# Patient Record
Sex: FEMALE | Race: WHITE | NOT HISPANIC OR LATINO | Employment: OTHER | URBAN - METROPOLITAN AREA
[De-identification: names, ages, dates, MRNs, and addresses within clinical notes are randomized per-mention and may not be internally consistent; named-entity substitution may affect disease eponyms.]

---

## 2020-01-15 ENCOUNTER — HOSPITAL ENCOUNTER (INPATIENT)
Facility: HOSPITAL | Age: 85
LOS: 3 days | Discharge: NURSING, REHAB OR LONG-TERM CARE | End: 2020-01-18
Attending: ORTHOPAEDIC SURGERY | Admitting: ORTHOPAEDIC SURGERY

## 2020-01-15 ENCOUNTER — APPOINTMENT (OUTPATIENT)
Dept: CT IMAGING | Facility: HOSPITAL | Age: 85
End: 2020-01-15

## 2020-01-15 ENCOUNTER — APPOINTMENT (OUTPATIENT)
Dept: GENERAL RADIOLOGY | Facility: HOSPITAL | Age: 85
End: 2020-01-15

## 2020-01-15 PROBLEM — S72.002A LEFT DISPLACED FEMORAL NECK FRACTURE (HCC): Status: ACTIVE | Noted: 2020-01-15

## 2020-01-15 LAB
ANION GAP SERPL CALCULATED.3IONS-SCNC: 13 MMOL/L (ref 5–15)
BUN BLD-MCNC: 25 MG/DL (ref 8–23)
BUN/CREAT SERPL: 25 (ref 7–25)
CALCIUM SPEC-SCNC: 10.2 MG/DL (ref 8.6–10.5)
CHLORIDE SERPL-SCNC: 96 MMOL/L (ref 98–107)
CO2 SERPL-SCNC: 27 MMOL/L (ref 22–29)
CREAT BLD-MCNC: 1 MG/DL (ref 0.57–1)
DEPRECATED RDW RBC AUTO: 42.4 FL (ref 37–54)
ERYTHROCYTE [DISTWIDTH] IN BLOOD BY AUTOMATED COUNT: 12.2 % (ref 12.3–15.4)
GFR SERPL CREATININE-BSD FRML MDRD: 52 ML/MIN/1.73
GLUCOSE BLD-MCNC: 94 MG/DL (ref 65–99)
HCT VFR BLD AUTO: 39.3 % (ref 34–46.6)
HGB BLD-MCNC: 13.1 G/DL (ref 12–15.9)
INR PPP: 1.08 (ref 0.9–1.1)
MCH RBC QN AUTO: 31.3 PG (ref 26.6–33)
MCHC RBC AUTO-ENTMCNC: 33.3 G/DL (ref 31.5–35.7)
MCV RBC AUTO: 94 FL (ref 79–97)
PLATELET # BLD AUTO: 253 10*3/MM3 (ref 140–450)
PMV BLD AUTO: 11 FL (ref 6–12)
POTASSIUM BLD-SCNC: 3.7 MMOL/L (ref 3.5–5.2)
PROTHROMBIN TIME: 13.7 SECONDS (ref 11.7–14.2)
RBC # BLD AUTO: 4.18 10*6/MM3 (ref 3.77–5.28)
SODIUM BLD-SCNC: 136 MMOL/L (ref 136–145)
WBC NRBC COR # BLD: 9.95 10*3/MM3 (ref 3.4–10.8)

## 2020-01-15 PROCEDURE — 71045 X-RAY EXAM CHEST 1 VIEW: CPT

## 2020-01-15 PROCEDURE — 93010 ELECTROCARDIOGRAM REPORT: CPT | Performed by: INTERNAL MEDICINE

## 2020-01-15 PROCEDURE — 85610 PROTHROMBIN TIME: CPT | Performed by: ORTHOPAEDIC SURGERY

## 2020-01-15 PROCEDURE — 80048 BASIC METABOLIC PNL TOTAL CA: CPT | Performed by: ORTHOPAEDIC SURGERY

## 2020-01-15 PROCEDURE — 93005 ELECTROCARDIOGRAM TRACING: CPT | Performed by: ORTHOPAEDIC SURGERY

## 2020-01-15 PROCEDURE — 85027 COMPLETE CBC AUTOMATED: CPT | Performed by: ORTHOPAEDIC SURGERY

## 2020-01-15 PROCEDURE — 73700 CT LOWER EXTREMITY W/O DYE: CPT

## 2020-01-15 RX ORDER — CYCLOSPORINE 0.5 MG/ML
1 EMULSION OPHTHALMIC 2 TIMES DAILY
Status: DISCONTINUED | OUTPATIENT
Start: 2020-01-15 | End: 2020-01-18 | Stop reason: HOSPADM

## 2020-01-15 RX ORDER — HYDROCODONE BITARTRATE AND ACETAMINOPHEN 5; 325 MG/1; MG/1
1 TABLET ORAL EVERY 4 HOURS PRN
Status: DISCONTINUED | OUTPATIENT
Start: 2020-01-15 | End: 2020-01-16

## 2020-01-15 RX ORDER — CYCLOSPORINE 0.5 MG/ML
1 EMULSION OPHTHALMIC 2 TIMES DAILY
COMMUNITY

## 2020-01-15 RX ORDER — HYDROCODONE BITARTRATE AND ACETAMINOPHEN 5; 325 MG/1; MG/1
1 TABLET ORAL EVERY 4 HOURS PRN
COMMUNITY
End: 2021-08-06 | Stop reason: HOSPADM

## 2020-01-15 RX ADMIN — HYDROCODONE BITARTRATE AND ACETAMINOPHEN 1 TABLET: 5; 325 TABLET ORAL at 22:08

## 2020-01-15 RX ADMIN — METOPROLOL TARTRATE 12.5 MG: 25 TABLET ORAL at 23:25

## 2020-01-15 RX ADMIN — CYCLOSPORINE 1 DROP: 0.5 EMULSION OPHTHALMIC at 23:26

## 2020-01-16 ENCOUNTER — ANESTHESIA (OUTPATIENT)
Dept: PERIOP | Facility: HOSPITAL | Age: 85
End: 2020-01-16

## 2020-01-16 ENCOUNTER — ANESTHESIA EVENT (OUTPATIENT)
Dept: PERIOP | Facility: HOSPITAL | Age: 85
End: 2020-01-16

## 2020-01-16 ENCOUNTER — APPOINTMENT (OUTPATIENT)
Dept: GENERAL RADIOLOGY | Facility: HOSPITAL | Age: 85
End: 2020-01-16

## 2020-01-16 PROCEDURE — 25010000003 BUPIVACAINE LIPOSOME 1.3 % SUSPENSION 20 ML VIAL: Performed by: ORTHOPAEDIC SURGERY

## 2020-01-16 PROCEDURE — 25010000003 CEFAZOLIN IN DEXTROSE 2-4 GM/100ML-% SOLUTION: Performed by: ORTHOPAEDIC SURGERY

## 2020-01-16 PROCEDURE — C1713 ANCHOR/SCREW BN/BN,TIS/BN: HCPCS | Performed by: ORTHOPAEDIC SURGERY

## 2020-01-16 PROCEDURE — C1776 JOINT DEVICE (IMPLANTABLE): HCPCS | Performed by: ORTHOPAEDIC SURGERY

## 2020-01-16 PROCEDURE — 72170 X-RAY EXAM OF PELVIS: CPT

## 2020-01-16 PROCEDURE — 25010000002 PROPOFOL 10 MG/ML EMULSION: Performed by: NURSE ANESTHETIST, CERTIFIED REGISTERED

## 2020-01-16 PROCEDURE — 97110 THERAPEUTIC EXERCISES: CPT

## 2020-01-16 PROCEDURE — 25010000002 ONDANSETRON PER 1 MG: Performed by: NURSE ANESTHETIST, CERTIFIED REGISTERED

## 2020-01-16 PROCEDURE — C9290 INJ, BUPIVACAINE LIPOSOME: HCPCS | Performed by: ORTHOPAEDIC SURGERY

## 2020-01-16 PROCEDURE — 25010000002 HYDROMORPHONE PER 4 MG: Performed by: NURSE ANESTHETIST, CERTIFIED REGISTERED

## 2020-01-16 PROCEDURE — 25010000002 FENTANYL CITRATE (PF) 100 MCG/2ML SOLUTION: Performed by: ANESTHESIOLOGY

## 2020-01-16 PROCEDURE — 97162 PT EVAL MOD COMPLEX 30 MIN: CPT

## 2020-01-16 PROCEDURE — 25010000002 FENTANYL CITRATE (PF) 100 MCG/2ML SOLUTION: Performed by: NURSE ANESTHETIST, CERTIFIED REGISTERED

## 2020-01-16 PROCEDURE — 25010000002 NEOSTIGMINE PER 0.5 MG: Performed by: NURSE ANESTHETIST, CERTIFIED REGISTERED

## 2020-01-16 PROCEDURE — 25010000002 MIDAZOLAM PER 1 MG: Performed by: ANESTHESIOLOGY

## 2020-01-16 PROCEDURE — 0SRS0J9 REPLACEMENT OF LEFT HIP JOINT, FEMORAL SURFACE WITH SYNTHETIC SUBSTITUTE, CEMENTED, OPEN APPROACH: ICD-10-PCS | Performed by: ORTHOPAEDIC SURGERY

## 2020-01-16 PROCEDURE — 25010000002 DEXAMETHASONE PER 1 MG: Performed by: NURSE ANESTHETIST, CERTIFIED REGISTERED

## 2020-01-16 DEVICE — SUT FW #2 W/TPR NDL 1/2 CIR 38IN 97CM 26.5MM BLU: Type: IMPLANTABLE DEVICE | Site: HIP | Status: FUNCTIONAL

## 2020-01-16 DEVICE — CMT BONE R 1X40: Type: IMPLANTABLE DEVICE | Site: HIP | Status: FUNCTIONAL

## 2020-01-16 DEVICE — SUT NONABS MAXBRAID/PE NMBR2 HC5 38IN BLU 900334: Type: IMPLANTABLE DEVICE | Site: HIP | Status: FUNCTIONAL

## 2020-01-16 DEVICE — IMPLANTABLE DEVICE: Type: IMPLANTABLE DEVICE | Site: HIP | Status: FUNCTIONAL

## 2020-01-16 DEVICE — LINER VERSYS ASMBL POLY 42/43MM OD X26MM: Type: IMPLANTABLE DEVICE | Site: HIP | Status: FUNCTIONAL

## 2020-01-16 DEVICE — CENTRLZR VERSYS DIST 9MM: Type: IMPLANTABLE DEVICE | Site: HIP | Status: FUNCTIONAL

## 2020-01-16 DEVICE — STEM FEM/HIP VERSYS ADVOCATE NONVLIGN STD/OFFST SZ11 120MM: Type: IMPLANTABLE DEVICE | Site: HIP | Status: FUNCTIONAL

## 2020-01-16 DEVICE — CAP PRT HIP BIPOL: Type: IMPLANTABLE DEVICE | Site: HIP | Status: FUNCTIONAL

## 2020-01-16 RX ORDER — HYDROMORPHONE HYDROCHLORIDE 1 MG/ML
0.5 INJECTION, SOLUTION INTRAMUSCULAR; INTRAVENOUS; SUBCUTANEOUS
Status: DISCONTINUED | OUTPATIENT
Start: 2020-01-16 | End: 2020-01-16 | Stop reason: HOSPADM

## 2020-01-16 RX ORDER — SODIUM CHLORIDE 0.9 % (FLUSH) 0.9 %
10 SYRINGE (ML) INJECTION EVERY 12 HOURS SCHEDULED
Status: DISCONTINUED | OUTPATIENT
Start: 2020-01-16 | End: 2020-01-16 | Stop reason: HOSPADM

## 2020-01-16 RX ORDER — SODIUM CHLORIDE 0.9 % (FLUSH) 0.9 %
10 SYRINGE (ML) INJECTION AS NEEDED
Status: DISCONTINUED | OUTPATIENT
Start: 2020-01-16 | End: 2020-01-16 | Stop reason: HOSPADM

## 2020-01-16 RX ORDER — EPHEDRINE SULFATE 50 MG/ML
INJECTION, SOLUTION INTRAVENOUS AS NEEDED
Status: DISCONTINUED | OUTPATIENT
Start: 2020-01-16 | End: 2020-01-16 | Stop reason: SURG

## 2020-01-16 RX ORDER — DIPHENHYDRAMINE HYDROCHLORIDE 50 MG/ML
12.5 INJECTION INTRAMUSCULAR; INTRAVENOUS
Status: DISCONTINUED | OUTPATIENT
Start: 2020-01-16 | End: 2020-01-16 | Stop reason: HOSPADM

## 2020-01-16 RX ORDER — ACETAMINOPHEN 325 MG/1
650 TABLET ORAL ONCE AS NEEDED
Status: DISCONTINUED | OUTPATIENT
Start: 2020-01-16 | End: 2020-01-16 | Stop reason: HOSPADM

## 2020-01-16 RX ORDER — HYDRALAZINE HYDROCHLORIDE 20 MG/ML
5 INJECTION INTRAMUSCULAR; INTRAVENOUS
Status: DISCONTINUED | OUTPATIENT
Start: 2020-01-16 | End: 2020-01-16 | Stop reason: HOSPADM

## 2020-01-16 RX ORDER — CEFAZOLIN SODIUM 2 G/100ML
2 INJECTION, SOLUTION INTRAVENOUS EVERY 8 HOURS
Status: COMPLETED | OUTPATIENT
Start: 2020-01-16 | End: 2020-01-17

## 2020-01-16 RX ORDER — FLUMAZENIL 0.1 MG/ML
0.2 INJECTION INTRAVENOUS AS NEEDED
Status: DISCONTINUED | OUTPATIENT
Start: 2020-01-16 | End: 2020-01-16 | Stop reason: HOSPADM

## 2020-01-16 RX ORDER — MAGNESIUM HYDROXIDE 1200 MG/15ML
LIQUID ORAL AS NEEDED
Status: DISCONTINUED | OUTPATIENT
Start: 2020-01-16 | End: 2020-01-16 | Stop reason: HOSPADM

## 2020-01-16 RX ORDER — ONDANSETRON 2 MG/ML
4 INJECTION INTRAMUSCULAR; INTRAVENOUS EVERY 6 HOURS PRN
Status: DISCONTINUED | OUTPATIENT
Start: 2020-01-16 | End: 2020-01-18 | Stop reason: HOSPADM

## 2020-01-16 RX ORDER — SODIUM CHLORIDE, SODIUM LACTATE, POTASSIUM CHLORIDE, CALCIUM CHLORIDE 600; 310; 30; 20 MG/100ML; MG/100ML; MG/100ML; MG/100ML
75 INJECTION, SOLUTION INTRAVENOUS CONTINUOUS
Status: ACTIVE | OUTPATIENT
Start: 2020-01-16 | End: 2020-01-16

## 2020-01-16 RX ORDER — PROMETHAZINE HYDROCHLORIDE 25 MG/1
25 SUPPOSITORY RECTAL ONCE AS NEEDED
Status: DISCONTINUED | OUTPATIENT
Start: 2020-01-16 | End: 2020-01-16 | Stop reason: HOSPADM

## 2020-01-16 RX ORDER — HYDROCODONE BITARTRATE AND ACETAMINOPHEN 5; 325 MG/1; MG/1
2 TABLET ORAL EVERY 4 HOURS PRN
Status: DISCONTINUED | OUTPATIENT
Start: 2020-01-16 | End: 2020-01-18 | Stop reason: HOSPADM

## 2020-01-16 RX ORDER — SENNA AND DOCUSATE SODIUM 50; 8.6 MG/1; MG/1
1 TABLET, FILM COATED ORAL 2 TIMES DAILY
Status: DISCONTINUED | OUTPATIENT
Start: 2020-01-16 | End: 2020-01-18 | Stop reason: HOSPADM

## 2020-01-16 RX ORDER — ONDANSETRON 2 MG/ML
4 INJECTION INTRAMUSCULAR; INTRAVENOUS ONCE AS NEEDED
Status: COMPLETED | OUTPATIENT
Start: 2020-01-16 | End: 2020-01-16

## 2020-01-16 RX ORDER — DIPHENHYDRAMINE HCL 25 MG
25 CAPSULE ORAL
Status: DISCONTINUED | OUTPATIENT
Start: 2020-01-16 | End: 2020-01-16 | Stop reason: HOSPADM

## 2020-01-16 RX ORDER — PROMETHAZINE HYDROCHLORIDE 25 MG/ML
12.5 INJECTION, SOLUTION INTRAMUSCULAR; INTRAVENOUS ONCE AS NEEDED
Status: DISCONTINUED | OUTPATIENT
Start: 2020-01-16 | End: 2020-01-16 | Stop reason: HOSPADM

## 2020-01-16 RX ORDER — DIAZEPAM 5 MG/1
5 TABLET ORAL ONCE
Status: COMPLETED | OUTPATIENT
Start: 2020-01-16 | End: 2020-01-16

## 2020-01-16 RX ORDER — ROCURONIUM BROMIDE 10 MG/ML
INJECTION, SOLUTION INTRAVENOUS AS NEEDED
Status: DISCONTINUED | OUTPATIENT
Start: 2020-01-16 | End: 2020-01-16 | Stop reason: SURG

## 2020-01-16 RX ORDER — EPHEDRINE SULFATE 50 MG/ML
5 INJECTION, SOLUTION INTRAVENOUS ONCE AS NEEDED
Status: DISCONTINUED | OUTPATIENT
Start: 2020-01-16 | End: 2020-01-16 | Stop reason: HOSPADM

## 2020-01-16 RX ORDER — SODIUM CHLORIDE, SODIUM LACTATE, POTASSIUM CHLORIDE, CALCIUM CHLORIDE 600; 310; 30; 20 MG/100ML; MG/100ML; MG/100ML; MG/100ML
9 INJECTION, SOLUTION INTRAVENOUS CONTINUOUS PRN
Status: DISCONTINUED | OUTPATIENT
Start: 2020-01-16 | End: 2020-01-18 | Stop reason: HOSPADM

## 2020-01-16 RX ORDER — CEFAZOLIN SODIUM 2 G/100ML
2 INJECTION, SOLUTION INTRAVENOUS ONCE
Status: COMPLETED | OUTPATIENT
Start: 2020-01-16 | End: 2020-01-16

## 2020-01-16 RX ORDER — FENTANYL CITRATE 50 UG/ML
50 INJECTION, SOLUTION INTRAMUSCULAR; INTRAVENOUS
Status: DISCONTINUED | OUTPATIENT
Start: 2020-01-16 | End: 2020-01-16 | Stop reason: HOSPADM

## 2020-01-16 RX ORDER — NALOXONE HCL 0.4 MG/ML
0.1 VIAL (ML) INJECTION
Status: DISCONTINUED | OUTPATIENT
Start: 2020-01-16 | End: 2020-01-16

## 2020-01-16 RX ORDER — HYDROCODONE BITARTRATE AND ACETAMINOPHEN 5; 325 MG/1; MG/1
1 TABLET ORAL EVERY 4 HOURS PRN
Status: DISCONTINUED | OUTPATIENT
Start: 2020-01-16 | End: 2020-01-18 | Stop reason: HOSPADM

## 2020-01-16 RX ORDER — FAMOTIDINE 10 MG/ML
20 INJECTION, SOLUTION INTRAVENOUS
Status: COMPLETED | OUTPATIENT
Start: 2020-01-16 | End: 2020-01-16

## 2020-01-16 RX ORDER — LIDOCAINE HYDROCHLORIDE 20 MG/ML
INJECTION, SOLUTION INFILTRATION; PERINEURAL AS NEEDED
Status: DISCONTINUED | OUTPATIENT
Start: 2020-01-16 | End: 2020-01-16 | Stop reason: SURG

## 2020-01-16 RX ORDER — LABETALOL HYDROCHLORIDE 5 MG/ML
5 INJECTION, SOLUTION INTRAVENOUS
Status: DISCONTINUED | OUTPATIENT
Start: 2020-01-16 | End: 2020-01-16 | Stop reason: HOSPADM

## 2020-01-16 RX ORDER — DEXAMETHASONE SODIUM PHOSPHATE 10 MG/ML
INJECTION INTRAMUSCULAR; INTRAVENOUS AS NEEDED
Status: DISCONTINUED | OUTPATIENT
Start: 2020-01-16 | End: 2020-01-16 | Stop reason: SURG

## 2020-01-16 RX ORDER — FENTANYL CITRATE 50 UG/ML
25 INJECTION, SOLUTION INTRAMUSCULAR; INTRAVENOUS
Status: DISCONTINUED | OUTPATIENT
Start: 2020-01-16 | End: 2020-01-16 | Stop reason: HOSPADM

## 2020-01-16 RX ORDER — GLYCOPYRROLATE 0.2 MG/ML
INJECTION INTRAMUSCULAR; INTRAVENOUS AS NEEDED
Status: DISCONTINUED | OUTPATIENT
Start: 2020-01-16 | End: 2020-01-16 | Stop reason: SURG

## 2020-01-16 RX ORDER — HYDROMORPHONE HYDROCHLORIDE 1 MG/ML
0.5 INJECTION, SOLUTION INTRAMUSCULAR; INTRAVENOUS; SUBCUTANEOUS
Status: DISCONTINUED | OUTPATIENT
Start: 2020-01-16 | End: 2020-01-16

## 2020-01-16 RX ORDER — NALOXONE HCL 0.4 MG/ML
0.2 VIAL (ML) INJECTION AS NEEDED
Status: DISCONTINUED | OUTPATIENT
Start: 2020-01-16 | End: 2020-01-16 | Stop reason: HOSPADM

## 2020-01-16 RX ORDER — ONDANSETRON 2 MG/ML
INJECTION INTRAMUSCULAR; INTRAVENOUS AS NEEDED
Status: DISCONTINUED | OUTPATIENT
Start: 2020-01-16 | End: 2020-01-16 | Stop reason: SURG

## 2020-01-16 RX ORDER — ONDANSETRON 4 MG/1
4 TABLET, FILM COATED ORAL EVERY 6 HOURS PRN
Status: DISCONTINUED | OUTPATIENT
Start: 2020-01-16 | End: 2020-01-18 | Stop reason: HOSPADM

## 2020-01-16 RX ORDER — PROMETHAZINE HYDROCHLORIDE 25 MG/1
25 TABLET ORAL ONCE AS NEEDED
Status: DISCONTINUED | OUTPATIENT
Start: 2020-01-16 | End: 2020-01-16 | Stop reason: HOSPADM

## 2020-01-16 RX ORDER — MIDAZOLAM HYDROCHLORIDE 1 MG/ML
0.5 INJECTION INTRAMUSCULAR; INTRAVENOUS
Status: DISCONTINUED | OUTPATIENT
Start: 2020-01-16 | End: 2020-01-16 | Stop reason: HOSPADM

## 2020-01-16 RX ORDER — MIDAZOLAM HYDROCHLORIDE 1 MG/ML
2 INJECTION INTRAMUSCULAR; INTRAVENOUS
Status: DISCONTINUED | OUTPATIENT
Start: 2020-01-16 | End: 2020-01-16 | Stop reason: HOSPADM

## 2020-01-16 RX ORDER — DIAZEPAM 5 MG/ML
5 INJECTION, SOLUTION INTRAMUSCULAR; INTRAVENOUS ONCE
Status: DISCONTINUED | OUTPATIENT
Start: 2020-01-16 | End: 2020-01-16

## 2020-01-16 RX ORDER — HYDROCODONE BITARTRATE AND ACETAMINOPHEN 7.5; 325 MG/1; MG/1
1 TABLET ORAL ONCE AS NEEDED
Status: DISCONTINUED | OUTPATIENT
Start: 2020-01-16 | End: 2020-01-16 | Stop reason: HOSPADM

## 2020-01-16 RX ORDER — TRANEXAMIC ACID 100 MG/ML
INJECTION, SOLUTION INTRAVENOUS AS NEEDED
Status: DISCONTINUED | OUTPATIENT
Start: 2020-01-16 | End: 2020-01-16 | Stop reason: SURG

## 2020-01-16 RX ORDER — PROPOFOL 10 MG/ML
VIAL (ML) INTRAVENOUS AS NEEDED
Status: DISCONTINUED | OUTPATIENT
Start: 2020-01-16 | End: 2020-01-16 | Stop reason: SURG

## 2020-01-16 RX ORDER — PROMETHAZINE HYDROCHLORIDE 25 MG/ML
6.25 INJECTION, SOLUTION INTRAMUSCULAR; INTRAVENOUS
Status: DISCONTINUED | OUTPATIENT
Start: 2020-01-16 | End: 2020-01-16 | Stop reason: HOSPADM

## 2020-01-16 RX ORDER — OXYCODONE AND ACETAMINOPHEN 7.5; 325 MG/1; MG/1
1 TABLET ORAL ONCE AS NEEDED
Status: DISCONTINUED | OUTPATIENT
Start: 2020-01-16 | End: 2020-01-16 | Stop reason: HOSPADM

## 2020-01-16 RX ADMIN — HYDROCODONE BITARTRATE AND ACETAMINOPHEN 1 TABLET: 5; 325 TABLET ORAL at 13:21

## 2020-01-16 RX ADMIN — CEFAZOLIN SODIUM 2 G: 2 INJECTION, SOLUTION INTRAVENOUS at 08:21

## 2020-01-16 RX ADMIN — HYDROCODONE BITARTRATE AND ACETAMINOPHEN 2 TABLET: 5; 325 TABLET ORAL at 21:47

## 2020-01-16 RX ADMIN — METOPROLOL TARTRATE 12.5 MG: 25 TABLET ORAL at 20:07

## 2020-01-16 RX ADMIN — GLYCOPYRROLATE 0.4 MG: 0.2 INJECTION INTRAMUSCULAR; INTRAVENOUS at 09:57

## 2020-01-16 RX ADMIN — METOPROLOL TARTRATE 12.5 MG: 25 TABLET ORAL at 07:30

## 2020-01-16 RX ADMIN — MIDAZOLAM 0.5 MG: 1 INJECTION INTRAMUSCULAR; INTRAVENOUS at 07:54

## 2020-01-16 RX ADMIN — TRANEXAMIC ACID 1000 MG: 100 INJECTION, SOLUTION INTRAVENOUS at 08:32

## 2020-01-16 RX ADMIN — CEFAZOLIN SODIUM 2 G: 2 INJECTION, SOLUTION INTRAVENOUS at 23:54

## 2020-01-16 RX ADMIN — HYDROCODONE BITARTRATE AND ACETAMINOPHEN 1 TABLET: 5; 325 TABLET ORAL at 02:29

## 2020-01-16 RX ADMIN — EPHEDRINE SULFATE 10 MG: 50 INJECTION INTRAVENOUS at 09:16

## 2020-01-16 RX ADMIN — SENNOSIDES AND DOCUSATE SODIUM 1 TABLET: 8.6; 5 TABLET ORAL at 15:11

## 2020-01-16 RX ADMIN — SODIUM CHLORIDE, POTASSIUM CHLORIDE, SODIUM LACTATE AND CALCIUM CHLORIDE 75 ML/HR: 600; 310; 30; 20 INJECTION, SOLUTION INTRAVENOUS at 13:21

## 2020-01-16 RX ADMIN — ONDANSETRON 4 MG: 2 INJECTION INTRAMUSCULAR; INTRAVENOUS at 11:06

## 2020-01-16 RX ADMIN — SODIUM CHLORIDE, POTASSIUM CHLORIDE, SODIUM LACTATE AND CALCIUM CHLORIDE 9 ML/HR: 600; 310; 30; 20 INJECTION, SOLUTION INTRAVENOUS at 10:29

## 2020-01-16 RX ADMIN — FENTANYL CITRATE 25 MCG: 50 INJECTION INTRAMUSCULAR; INTRAVENOUS at 07:54

## 2020-01-16 RX ADMIN — FAMOTIDINE 20 MG: 10 INJECTION INTRAVENOUS at 07:54

## 2020-01-16 RX ADMIN — CEFAZOLIN SODIUM 2 G: 2 INJECTION, SOLUTION INTRAVENOUS at 16:35

## 2020-01-16 RX ADMIN — LIDOCAINE HYDROCHLORIDE 40 MG: 20 INJECTION, SOLUTION INFILTRATION; PERINEURAL at 08:13

## 2020-01-16 RX ADMIN — FENTANYL CITRATE 50 MCG: 50 INJECTION, SOLUTION INTRAMUSCULAR; INTRAVENOUS at 10:27

## 2020-01-16 RX ADMIN — HYDROMORPHONE HYDROCHLORIDE 0.5 MG: 1 INJECTION, SOLUTION INTRAMUSCULAR; INTRAVENOUS; SUBCUTANEOUS at 10:36

## 2020-01-16 RX ADMIN — FENTANYL CITRATE 50 MCG: 50 INJECTION INTRAMUSCULAR; INTRAVENOUS at 08:33

## 2020-01-16 RX ADMIN — FENTANYL CITRATE 50 MCG: 50 INJECTION INTRAMUSCULAR; INTRAVENOUS at 08:53

## 2020-01-16 RX ADMIN — FENTANYL CITRATE 50 MCG: 50 INJECTION, SOLUTION INTRAMUSCULAR; INTRAVENOUS at 10:48

## 2020-01-16 RX ADMIN — HYDROMORPHONE HYDROCHLORIDE 0.5 MG: 1 INJECTION, SOLUTION INTRAMUSCULAR; INTRAVENOUS; SUBCUTANEOUS at 11:02

## 2020-01-16 RX ADMIN — HYDROCODONE BITARTRATE AND ACETAMINOPHEN 1 TABLET: 5; 325 TABLET ORAL at 17:43

## 2020-01-16 RX ADMIN — DEXAMETHASONE SODIUM PHOSPHATE 8 MG: 10 INJECTION INTRAMUSCULAR; INTRAVENOUS at 08:34

## 2020-01-16 RX ADMIN — ROCURONIUM BROMIDE 40 MG: 10 INJECTION INTRAVENOUS at 08:13

## 2020-01-16 RX ADMIN — PROPOFOL 150 MG: 10 INJECTION, EMULSION INTRAVENOUS at 08:13

## 2020-01-16 RX ADMIN — ONDANSETRON HYDROCHLORIDE 4 MG: 2 SOLUTION INTRAMUSCULAR; INTRAVENOUS at 08:11

## 2020-01-16 RX ADMIN — SODIUM CHLORIDE, POTASSIUM CHLORIDE, SODIUM LACTATE AND CALCIUM CHLORIDE 9 ML/HR: 600; 310; 30; 20 INJECTION, SOLUTION INTRAVENOUS at 07:54

## 2020-01-16 RX ADMIN — ROCURONIUM BROMIDE 10 MG: 10 INJECTION INTRAVENOUS at 09:25

## 2020-01-16 RX ADMIN — NEOSTIGMINE METHYLSULFATE 2.5 MG: 1 INJECTION INTRAMUSCULAR; INTRAVENOUS; SUBCUTANEOUS at 09:57

## 2020-01-16 RX ADMIN — DIAZEPAM 5 MG: 5 TABLET ORAL at 19:05

## 2020-01-16 NOTE — PLAN OF CARE
Problem: Patient Care Overview  Goal: Plan of Care Review  Outcome: Ongoing (interventions implemented as appropriate)  Flowsheets (Taken 1/16/2020 0258)  Progress: no change  Plan of Care Reviewed With: patient  Outcome Summary: VSS during shift. NPO since midnight except sips with medication. Voiding well. NVI. Pain controlled with current pain medication.

## 2020-01-16 NOTE — THERAPY EVALUATION
Patient Name: Bernie Thomas  : 1932    MRN: [id number]                              Today's Date: 2020       Admit Date: 1/15/2020    Visit Dx: No diagnosis found.  Patient Active Problem List   Diagnosis   • Left displaced femoral neck fracture (CMS/HCC)     Past Medical History:   Diagnosis Date   • A-fib (CMS/HCC)    • A-fib (CMS/HCC)    • Osteoporosis      Past Surgical History:   Procedure Laterality Date   • CATARACT EXTRACTION, BILATERAL     • HYSTERECTOMY       General Information     Row Name 20 1620          PT Evaluation Time/Intention    Document Type  evaluation  -EM     Mode of Treatment  individual therapy;physical therapy  -EM     Row Name 20 1620          General Information    Patient Profile Reviewed?  yes  -EM     Prior Level of Function  independent:;community mobility  -EM     Existing Precautions/Restrictions  fall;left;hip, posterior  -EM     Row Name 20 1620          Relationship/Environment    Lives With  alone has good family support  -EM     Row Name 20 1620          Resource/Environmental Concerns    Current Living Arrangements  home/apartment/condo  -EM     Row Name 20 1620          Home Main Entrance    Number of Stairs, Main Entrance  one  -EM     Row Name 20 1620          Stairs Within Home, Primary    Number of Stairs, Within Home, Primary  none  -EM     Row Name 20 1620          Cognitive Assessment/Intervention- PT/OT    Orientation Status (Cognition)  oriented x 4  -EM     Row Name 20 1620          Safety Issues, Functional Mobility    Impairments Affecting Function (Mobility)  endurance/activity tolerance;strength;pain  -EM       User Key  (r) = Recorded By, (t) = Taken By, (c) = Cosigned By    Initials Name Provider Type    EM Vicki Campos, PT Physical Therapist        Mobility     Row Name 20 1621          Bed Mobility Assessment/Treatment    Bed Mobility Assessment/Treatment  supine-sit  -EM      Supine-Sit [place] (Bed Mobility)  contact guard;verbal cues  -EM     Row Name 01/16/20 1621          Sit-Stand Transfer    Sit-Stand [place] (Transfers)  contact guard;verbal cues  -EM     Assistive Device (Sit-Stand Transfers)  walker, front-wheeled  -EM     Row Name 01/16/20 1621          Gait/Stairs Assessment/Training    [place] Level (Gait)  contact guard  -EM     Assistive Device (Gait)  walker, front-wheeled  -EM     Distance in Feet (Gait)  25  -EM     Deviations/Abnormal Patterns (Gait)  gait speed decreased  -EM     Comment (Gait/Stairs)  cues for sequencing  -EM       User Key  (r) = Recorded By, (t) = Taken By, (c) = Cosigned By    Initials Name Provider Type    EM Vicki Campos, PT Physical Therapist        Obj/Interventions     Row Name 01/16/20 1622          General ROM    GENERAL ROM COMMENTS  ROm WNL   -EM     Row Name 01/16/20 1622          MMT (Manual Muscle Testing)    General MMT Comments  no focal deficits identified, general post op weakness noted in LLE but not formally assessed   -EM     Row Name 01/16/20 1622          Therapeutic Exercise    Comment (Therapeutic Exercise)  10 reps ex per THR protocol (pt rested after every 5 reps)   -EM     Row Name 01/16/20 1622          Static Sitting Balance    Level of [place] (Unsupported Sitting, Static Balance)  supervision  -EM     Sitting Position (Unsupported Sitting, Static Balance)  sitting on edge of bed  -EM     Row Name 01/16/20 1622          Dynamic Sitting Balance    Level of [place], Reaches Outside Midline (Sitting, Dynamic Balance)  supervision  -EM     Sitting Position, Reaches Outside Midline (Sitting, Dynamic Balance)  sitting on edge of bed  -EM     Row Name 01/16/20 1622          Static Standing Balance    Level of [place] (Supported Standing, Static Balance)  contact guard assist  -EM     Assistive Device Utilized (Supported Standing, Static Balance)  walker, rolling  -EM     Row Name 01/16/20  1622          Dynamic Standing Balance    Level of [place], Reaches Outside Midline (Standing, Dynamic Balance)  contact guard assist  -EM     Assistive Device Utilized (Supported Standing, Dynamic Balance)  walker, rolling  -EM     Row Name 01/16/20 1622          Sensory Assessment/Intervention    Sensory General Assessment  no sensation deficits identified  -EM       User Key  (r) = Recorded By, (t) = Taken By, (c) = Cosigned By    Initials Name Provider Type    EM Vicki Campos, PT Physical Therapist        Goals/Plan     Row Name 01/16/20 1626          Bed Mobility Goal 1 (PT)    Activity/Assistive Device (Bed Mobility Goal 1, PT)  bed mobility activities, all  -EM     [place] Level/Cues Needed (Bed Mobility Goal 1, PT)  supervision required  -EM     Time Frame (Bed Mobility Goal 1, PT)  3 days  -EM     Row Name 01/16/20 1626          Transfer Goal 1 (PT)    Activity/Assistive Device (Transfer Goal 1, PT)  transfers, all;walker, rolling  -EM     [place] Level/Cues Needed (Transfer Goal 1, PT)  supervision required  -EM     Time Frame (Transfer Goal 1, PT)  3 days  -EM     Row Name 01/16/20 1626          Gait Training Goal 1 (PT)    Activity/Assistive Device (Gait Training Goal 1, PT)  gait (walking locomotion);walker, rolling  -EM     [place] Level (Gait Training Goal 1, PT)  supervision required  -EM     Distance (Gait Goal 1, PT)  200  -EM     Time Frame (Gait Training Goal 1, PT)  3 days  -EM     Row Name 01/16/20 1626          Stairs Goal 1 (PT)    Activity/Assistive Device (Stairs Goal 1, PT)  stairs, all skills;walker, rolling  -EM     [place] Level/Cues Needed (Stairs Goal 1, PT)  contact guard assist  -EM     Number of Stairs (Stairs Goal 1, PT)  2  -EM     Time Frame (Stairs Goal 1, PT)  3 days  -EM     Row Name 01/16/20 1626          Patient Education Goal (PT)    Activity (Patient Education Goal, PT)  patient able to perform 20 reps per THR protocol and verbalize  understanding of THR posterior precautions   -EM     [place]/Cues/Accuracy (Memory Goal 2, PT)  demonstrates adequately;verbalizes understanding  -EM     Time Frame (Patient Education Goal, PT)  3 days  -EM       User Key  (r) = Recorded By, (t) = Taken By, (c) = Cosigned By    Initials Name Provider Type    EM Vicki Campos, PT Physical Therapist        Clinical Impression     Row Name 01/16/20 0413          Pain Assessment    Additional Documentation  Pain Scale: Numbers Pre/Post-Treatment (Group)  -EM     Row Name 01/16/20 1403          Pain Scale: Numbers Pre/Post-Treatment    Pain Scale: Numbers, Pretreatment  7/10  -EM     Pain Location - Side  Left  -EM     Pain Location  hip  -EM     Pain Intervention(s)  Medication (See MAR);Repositioned  -EM     Row Name 01/16/20 5314          Plan of Care Review    Plan of Care Reviewed With  patient;family  -EM     Outcome Summary  Pt. presents s/p L bipolar hemiarthroplasty after being diagnosed with a L femoral neck stress fracture. PMH significant for afib, op. Patient lives alone, one step to enter home, no steps inside. Today, pt performed bed mobility with CGA, sit to stand tsf with CGA and ambulated 25 feet with rwx and CGA. Patient has good family support, d/c plans undecided at this time, possibly SNU or home with HH pending progress. Patient demonstrates impairments consisting of generalized post op weakness and pain, decreased activity tolerance and would benefit from skilled PT.   -EM     Row Name 01/16/20 1625          Physical Therapy Clinical Impression    Patient/Family Goals Statement (PT Clinical Impression)  decrease pain, be able to return to normal activities   -EM     Criteria for Skilled Interventions Met (PT Clinical Impression)  yes;treatment indicated  -EM     Rehab Potential (PT Clinical Summary)  good, to achieve stated therapy goals  -EM     Row Name 01/16/20 9930          Positioning and Restraints    Pre-Treatment Position  in  bed  -EM     Post Treatment Position  chair  -EM     In Chair  reclined;exit alarm on;call light within reach;with family/caregiver  -EM       User Key  (r) = Recorded By, (t) = Taken By, (c) = Cosigned By    Initials Name Provider Type    Vicki Turner PT Physical Therapist        Outcome Measures     Row Name 01/16/20 1628          How much help from another person do you currently need...    Turning from your back to your side while in flat bed without using bedrails?  3  -EM     Moving from lying on back to sitting on the side of a flat bed without bedrails?  3  -EM     Moving to and from a bed to a chair (including a wheelchair)?  3  -EM     Standing up from a chair using your arms (e.g., wheelchair, bedside chair)?  3  -EM     Climbing 3-5 steps with a railing?  3  -EM     To walk in hospital room?  3  -EM     AM-PAC 6 Clicks Score (PT)  18  -EM     Row Name 01/16/20 1629          Functional Assessment    Outcome Measure Options  AM-PAC 6 Clicks Basic Mobility (PT)  -EM       User Key  (r) = Recorded By, (t) = Taken By, (c) = Cosigned By    Initials Name Provider Type    Vicki Turner PT Physical Therapist          PT Recommendation and Plan  Planned Therapy Interventions (PT Eval): bed mobility training, gait training, home exercise program, transfer training  Outcome Summary/Treatment Plan (PT)  Anticipated Discharge Disposition (PT): home with home health, home with assist, skilled nursing facility  Plan of Care Reviewed With: patient, family  Outcome Summary: Pt. presents s/p L bipolar hemiarthroplasty after being diagnosed with a L femoral neck stress fracture. PMH significant for afib, op. Patient lives alone, one step to enter home, no steps inside. Today, pt performed bed mobility with CGA, sit to stand tsf with CGA and ambulated 25 feet with rwx and CGA. Patient has good family support, d/c plans undecided at this time, possibly SNU or home with HH pending progress. Patient  demonstrates impairments consisting of generalized post op weakness and pain, decreased activity tolerance and would benefit from skilled PT.      Time Calculation:   PT Charges     Row Name 01/16/20 1630             Time Calculation    Start Time  1550  -EM      Stop Time  1618  -EM      Time Calculation (min)  28 min  -EM      PT Received On  01/16/20  -EM      PT - Next Appointment  01/17/20  -EM      PT Goal Re-Cert Due Date  01/19/20  -EM         Time Calculation- PT    Total Timed Code Minutes- PT  17 minute(s)  -EM        User Key  (r) = Recorded By, (t) = Taken By, (c) = Cosigned By    Initials Name Provider Type    EM Vicki Campos, PT Physical Therapist        Therapy Charges for Today     Code Description Service Date Service Provider Modifiers Qty    25293777667 HC PT EVAL MOD COMPLEXITY 2 1/16/2020 Vicki Campos, PT GP 1    58143202035 HC PT THER PROC EA 15 MIN 1/16/2020 Vicki Campos PT GP 1          PT G-Codes  Outcome Measure Options: AM-PAC 6 Clicks Basic Mobility (PT)  AM-PAC 6 Clicks Score (PT): 18    Vicki Campos PT  1/16/2020

## 2020-01-16 NOTE — PLAN OF CARE
VSS, pain controlled. Having problems with muscle spasms in left thigh/hip area. Tried pain meds and spasms continued. Got a one time dose order of valium to be given. Waiting for med to profile. Patient up to chair x 1 and voided. Continue to monitor.

## 2020-01-16 NOTE — PROGRESS NOTES
Discharge Planning Assessment  [facility]     Patient Name: Bernie Thomas  MRN: [id number]  Today's Date: 1/16/2020    Admit Date: 1/15/2020    Discharge Needs Assessment     Row Name 01/16/20 1524       Living Environment    Lives With  alone    Current Living Arrangements  home/apartment/condo    Potentially Unsafe Housing Conditions  other (see comments) no concerns     Primary Care Provided by  self    Provides Primary Care For  no one    Family Caregiver if Needed  child(elsa), adult    Quality of Family Relationships  helpful;involved;supportive    Able to Return to Prior Arrangements  yes       Resource/Environmental Concerns    Resource/Environmental Concerns  none    Transportation Concerns  car, none       Transition Planning    Patient/Family Anticipates Transition to  home    Patient/Family Anticipated Services at Transition  home health care;skilled nursing    Transportation Anticipated  family or friend will provide       Discharge Needs Assessment    Readmission Within the Last 30 Days  no previous admission in last 30 days    Concerns to be Addressed  no discharge needs identified;denies needs/concerns at this time    Equipment Currently Used at Home  walker, rolling;shower chair    Anticipated Changes Related to Illness  none    Equipment Needed After Discharge  none    Outpatient/Agency/Support Group Needs  homecare agency;skilled nursing facility    Discharge Facility/Level of Care Needs  home with home health;nursing facility, skilled        Discharge Plan     Row Name 01/16/20 1525       Plan    Plan  Home with VNA vs. SNF at [facility] pending PT eval     Patient/Family in Agreement with Plan  yes    Plan Comments  CCP met with patient and patient's daughter, Anita at bedside. CCP role explained and discharge planning discussed. Face sheet verified and IMM noted. Patient lives alone with two steps to the entrance of her home. Patient's bedroom and bathroom are on the  main level. Patient has a walker at home. Patient also has a shower chair present in her bathroom. Patient is current with VNA. Patient and patient's daughter states if SNF is needed would like referrals to [facility] (1st choice) and [facility] (2nd choice) and [facility] (3rd choice). CCP will follow for PT eval and follow for pending SNF referrals. Mely Danielson CSW                 Destination      Coordination has not been started for this encounter.      Durable Medical Equipment      Coordination has not been started for this encounter.      Dialysis/Infusion      Coordination has not been started for this encounter.      Home Medical Care      Coordination has not been started for this encounter.      Therapy      Coordination has not been started for this encounter.      Community Resources      Coordination has not been started for this encounter.          Demographic Summary     Row Name 01/16/20 1524       General Information    Admission Type  inpatient    Required Notices Provided  Important Message from Medicare    Referral Source  admission list    Reason for Consult  discharge planning    Preferred Language  English     Used During This Interaction  no        Functional Status     Row Name 01/16/20 1524       Functional Status    Usual Activity Tolerance  good    Current Activity Tolerance  good       Functional Status, IADL    Medications  assistive equipment    Meal Preparation  assistive equipment    Housekeeping  assistive equipment    Laundry  assistive equipment    Shopping  assistive equipment       Mental Status    General Appearance WDL  WDL       Mental Status Summary    Recent Changes in Mental Status/Cognitive Functioning  no changes        Psychosocial    No documentation.       Abuse/Neglect    No documentation.       Legal    No documentation.       Substance Abuse    No documentation.       Patient Forms    No documentation.           Caroline Danielson,  CSW

## 2020-01-16 NOTE — ANESTHESIA POSTPROCEDURE EVALUATION
Patient: Bernie Thomas    Procedure Summary     Date:  01/16/20 Room / Location:  [facility] OR [address] [place] [address] MAIN OR    Anesthesia Start:  0807 Anesthesia Stop:  1016    Procedure:  LEFT HIP BI-POLAR (Left Hip) Diagnosis:      Surgeon:  Desmond Mcdonnell MD Provider:  Bradly Eli MD    Anesthesia Type:  general ASA Status:  2          Anesthesia Type: general    Vitals  Vitals Value Taken Time   /69 1/16/2020 10:40 AM   Temp     Pulse 81 1/16/2020 10:48 AM   Resp     SpO2 100 % 1/16/2020 10:47 AM   Vitals shown include unvalidated device data.        Post Anesthesia Care and Evaluation    Patient location during evaluation: PACU  Patient participation: complete - patient participated  Level of consciousness: awake and alert  Pain management: adequate  Airway patency: patent  Anesthetic complications: No anesthetic complications    Cardiovascular status: acceptable  Respiratory status: acceptable  Hydration status: acceptable    Comments: ---------------------------               01/16/20                      0735         ---------------------------   BP:          144/74         Pulse:         67           Resp:          18           Temp:   36.7 °C (98.1 °F)   SpO2:          95%         ---------------------------

## 2020-01-16 NOTE — OP NOTE
HIP ENDOPROSTHESIS  Procedure Note    Bernie Thomas  1/16/2020    Pre-op Diagnosis: Left Femoral Neck Fracture  Post-op Diagnosis: Same  Procedure: Left Hip Bipolar Hemiarthroplasty  Surgeon:  Desmond Mcdonnell MD   Assistant:  WENDY Dorado  Anesthesia: General, Anesthesiologist: Bradly Eli MD  CRNA: Crista Vyas CRNA  Staff: Circulator: Johnathan Manzo RN; Nery Crawley RN; Jocy Dejesus RN  Scrub Person: Kathe Cobb CFA  Estimated Blood Loss: 100ml  Specimens: * No orders in the log *  Drains: none  Complications: None    Surgical Implants:    Yovany Advocate Femoral Stem Size 11      43 mm Bipolar Shell      0 neck length      Stem Centralizer      Yovany Biomet Cement    Indication for Procedure:  This patient is an 87-year-old female who developed  left hip pain in the groin region.  X-rays revealed displaced femoral neck fracture.  This was confirmed with CT scan.  It was felt she would benefit from bipolar hemiarthroplasty.  She wished to proceed.  Risks, benefits, and alternatives of surgery were discussed with the patient, and informed consent was obtained. Risks include, but are not limited to infection, bleeding, nerve injury, blood clots associated with anesthesia and possibly death.     Procedure:  The patient was seen in Preoperative Holding Area where their surgical site was marked. Preoperative antibiotics were received. H&P and consent updated. They were taken to the Operating Room and provided general anesthesia on the Operating Room table. The patient was then moved into the lateral decubitus position with the operative hip towards the ceiling. Axillary roll placed. All bony prominences well padded. Operative hip prepped and draped in typical sterile fashion. Timeout performed confirming the correct surgical site and procedure. A standard posterior approach to the hip was performed. Incision taken down through skin and subcutaneous tissues. IT band and fascia  were carefully split. Hip was internally rotated. Short external rotators along with a T-capsulotomy were taken down off the hip joint. Fracture was identified. Retractors were placed.     A femoral neck cut was made with the saw followed by completion with an osteotome. The femoral head was then removed. Femoral head was measured and the appropriate size trial shell was placed and noted to be stable. At this point focus was placed on the femur. Sequential broaching up to the appropriate size 11 was performed. Calcar reamer was used to ream the calcar back down to a stable base. A neutral head was placed and reduced and noted to be stable.  There was significant osteoporosis.  At this point all instruments were removed.     The canal was prepared for cement. Cement restrictor was placed followed by brushing of the canal and thorough irrigation. It was suctioned dry while the cement was mixed on the back table. The cement was then injected into the canal and packed using my thumb. The stem was then placed with careful attention to anteversion. Excess cement removed. After about 12 minutes the cement had hardened. A trial head was then placed and reduced again. It was noted to be stable with 0 mm length. The final bipolar shell, 43 mm, was assembled and placed and malleted into position. It was stable on the stem. The hip was then reduced. Leg lengths were noted to be appropriate. The hip was stable with motion. The remainder of 3 liters of normal saline containing bacitracin were pulsed through the wound. The capsule was repaired with 0 Vicryl suture followed by the IT band and fascia with 0 Vicryl suture and #1 strata fix. The subcutaneous tissue was closed with 2-0 Vicryl suture followed by 3-0 strata fix for the skin.  Dermabond, Telfa, and Tegaderm were placed. The patient was placed in abduction pillow. They were taken to the PACU postoperatively in stable condition.    Postoperative Plan:   Protocols for  intravenous antibiotics and venous thrombosis were followed for this patient.  IV antibiotics were infused prior to surgery and will be discontinued within 24 hours of completion of the surgical procedure.  Thrombosis prophylaxis will be initiated within 24 hours of the completion of the surgical procedure.  She will restart her Eliquis.  The patient will be weight bearing as tolerated with posterior hip precautions.      Desmond Mcdonnell MD     Date: 1/16/2020  Time: 10:06 AM

## 2020-01-16 NOTE — DISCHARGE PLACEMENT REQUEST
"Kade Thomas Ivan (87 y.o. Female)     Date of Birth Social Security Number Address Home Phone MRN    05/11/1932  [address] [place] [address] [phone] [id number]    [employer] Marital Status          Judaism        Admission Date Admission Type Admitting Provider Attending Provider Department, Room/Bed    1/15/20 Elective Desmond Mcdonnell MD Dunkin, Bradley, MD [address], P793/1    Discharge Date Discharge Disposition Discharge Destination                       Attending Provider:  Desmond Mcdonnell MD    Allergies:  No Known Allergies    Isolation:  None   Infection:  None   Code Status:  CPR    Ht:  157.5 cm (62.01\")   Wt:  60.1 kg (132 lb 9.6 oz)    Admission Cmt:  None   Principal Problem:  None                Active Insurance as of 1/15/2020     Primary Coverage     Payor Plan Insurance Group Employer/Plan Group    MEDICARE MEDICARE A & B      Payor Plan Address Payor Plan Phone Number Payor Plan Fax Number Effective Dates    [address] [phone]  5/1/1997 - None Entered    [facility] [id number]       Subscriber Name Subscriber Birth Date Member ID       KADE THOMAS IVAN 5/11/1932 [id number]           Secondary Coverage     Payor Plan Insurance Group Employer/Plan Group    [employer] [place] [employer] [place] [employer]      Payor Plan Address Payor Plan Phone Number Payor Plan Fax Number Effective Dates    [address] [facility] [phone]  1/15/2020 - None Entered    [facility] [address]       Subscriber Name Subscriber Birth Date Member ID       KADE THOMAS IVAN 5/11/1932 [id number]                 Emergency Contacts      (Rel.) Home Phone Work Phone Mobile Phone    JOSEPHINE EMANUEL (Daughter) [phone] -- --              "

## 2020-01-16 NOTE — BRIEF OP NOTE
HIP ENDOPROSTHESIS  Progress Note    Bernie Pollock Martha  1/16/2020    Pre-op Diagnosis:   Left femoral neck fracture       Post-Op Diagnosis Codes:  Same    Procedure/CPT® Codes:      Procedure(s):  LEFT HIP BI-POLAR    Surgeon(s):  Van Molina APRN Dunkin, Bradley, MD    Anesthesia: General    Staff:   Circulator: Johnathan Manzo RN; Nery Crawley RN; Jocy Dejesus RN  Scrub Person: Kathe Cobb    Estimated Blood Loss: 100ml    Urine Voided: * No values recorded between 1/16/2020  8:02 AM and 1/16/2020 10:01 AM *    Specimens:                None          Drains: * No LDAs found *    Findings: Fracture    Complications: None      Desmond Mcdonnell MD     Date: 1/16/2020  Time: 10:01 AM

## 2020-01-16 NOTE — H&P
Orthopaedic H&P      Patient: Bernie Thomas    Date of Admission: 1/15/2020  6:59 PM    YOB: 1932    Medical Record Number: [id number]    Attending Physician:  Desmond Mcdonnell MD    Chief Complaints: Left displaced femoral neck fracture (CMS/HCC) [S72.002A]      History of Present Illness: 87 y.o. female admitted to [facility] with Left displaced femoral neck fracture (CMS/HCC) [S72.002A].  She was seen in the office 1 day prior with left hip fracture noted on x-ray.  She was direct admitted to the hospital. Onset of symptoms was abrupt starting 2 weeks ago.  Symptoms are associated with left hip pain.  Symptoms are aggravated by weightbearing.   Symptoms improve with rest.  The patient reports about 2 weeks ago that she stood up from a couch and felt intense pain in the left groin region.  From that time, she needed assistance with ambulation.  She did see a physician but x-rays were originally negative.  The pain persisted.  She did have some home therapy without improvement.  She presented to our office yesterday with displaced femoral neck fracture on the left side.  Based on the imaging, is felt she would benefit from possible percutaneous pinning versus bipolar hemiarthroplasty.  She was set up for a CT scan last night.    Allergies: No Known Allergies    Medications:   Home Medications:  Medications Prior to Admission   Medication Sig Dispense Refill Last Dose   • apixaban (ELIQUIS) 2.5 MG tablet tablet Take 2.5 mg by mouth 2 (Two) Times a Day.   1/15/2020 at 0900   • calcium citrate-vitamin d (CITRACAL) 200-250 MG-UNIT tablet tablet Take  by mouth Daily.   1/14/2020 at Unknown time   • cycloSPORINE (RESTASIS) 0.05 % ophthalmic emulsion Administer 1 drop to both eyes 2 (Two) Times a Day.    at Unknown time   • HYDROcodone-acetaminophen (NORCO) 5-325 MG per tablet Take 1 tablet by mouth Every 4 (Four) Hours As Needed.    at Unknown time   • metoprolol tartrate (LOPRESSOR) 25 MG  tablet Take 12.5 mg by mouth 2 (Two) Times a Day.    at Unknown time       Current Medications:  Scheduled Meds:  [MAR Hold] calcium-vitamin D 500 mg Oral Daily   [MAR Hold] cycloSPORINE 1 drop Both Eyes BID   metoprolol tartrate 12.5 mg Oral BID     Continuous Infusions:   PRN Meds:.[MAR Hold] HYDROcodone-acetaminophen    Past Medical History:   Diagnosis Date   • A-fib (CMS/HCC)    • A-fib (CMS/HCC)    • Osteoporosis      Past Surgical History:   Procedure Laterality Date   • CATARACT EXTRACTION, BILATERAL     • HYSTERECTOMY       Social History     Occupational History   • Not on file   Tobacco Use   • Smoking status: Never Smoker   • Smokeless tobacco: Never Used   Substance and Sexual Activity   • Alcohol use: Yes     Alcohol/week: 7.0 standard drinks     Types: 7 Glasses of wine per week   • Drug use: Never   • Sexual activity: Not on file    Social History     Social History Narrative   • Not on file     History reviewed. No pertinent family history.      Review of Systems:   No other pertinent positives or negatives other than what is mentioned in the HPI and below.  Constitutional: Negative for fatigue, fever, or weight loss  HEENT: No active headache.  Pulmonary: Patient denies SOA.  Cardiovascular: Patient denies any chest pain.  Gastrointestinal:  Patient denies active vomiting or diarrhea.  Musculoskeletal: Positive for left hip pain.  Neurological: Patient denies active dizziness or loss of consciousness.  Skin: Patient denies any active bleeding.    Vital signs in last 24 hours:  Temp:  [97.7 °F (36.5 °C)-98.3 °F (36.8 °C)] 98.1 °F (36.7 °C)  Heart Rate:  [63-77] 65  Resp:  [16] 16  BP: (114-152)/(68-70) 114/68  Vitals:    01/15/20 1850 01/15/20 2309 01/15/20 2349 01/16/20 0258   BP: 152/68 150/70  114/68   BP Location: Right arm Left arm  Left arm   Patient Position: Sitting Lying  Lying   Pulse: 63 77  65   Resp: 16 16  16   Temp: 98.3 °F (36.8 °C) 97.7 °F (36.5 °C)  98.1 °F (36.7 °C)   TempSrc:  "Oral Oral  Oral   SpO2: 95% 97%  96%   Weight:   60.1 kg (132 lb 9.6 oz)    Height: 157.5 cm (62\")  157.5 cm (62.01\")           Physical Exam: 87 y.o. female         General Appearance:  Alert, cooperative, in no acute distress    HEENT:    Atraumatic, Pupils are equal   Neck:   Cervical spine midline, no appreciable JVD   Lungs:     Breathing non-labored and chest rise symmetric    Heart:   Abdomen:     Rectal:  Musculoskeletal:     Extremities:   Pulses  Neurovascular:   Skin:         Pulse regular    Soft, Non-tender or distended    Deferred  Examination of the left hip reveals positive logroll.  No skin lesions.  Compartments are soft.  Normal motor and sensory exam.  Tenderness over the left hip area.    No clubbing, cyanosis, or edema    Intact    Cranial nerves 2 - 12 grossly intact, sensation intact    No skin lesions     Diagnostic Tests:    Results from last 7 days   Lab Units 01/15/20  2020   WBC 10*3/mm3 9.95   HEMOGLOBIN g/dL 13.1   HEMATOCRIT % 39.3   PLATELETS 10*3/mm3 253     Results from last 7 days   Lab Units 01/15/20  2020   SODIUM mmol/L 136   POTASSIUM mmol/L 3.7   CHLORIDE mmol/L 96*   CO2 mmol/L 27.0   BUN mg/dL 25*   CREATININE mg/dL 1.00   GLUCOSE mg/dL 94   CALCIUM mg/dL 10.2     Results from last 7 days   Lab Units 01/15/20  2020   INR  1.08     CT scan of the left hip reviewed.  There is an impacted left femoral neck fracture that is subacute.  Slight varus deformity.    Assessment:  Patient Active Problem List   Diagnosis   • Left displaced femoral neck fracture (CMS/HCC)       Plan:  The patient voiced understanding of the risks, benefits, and alternative forms of treatment that were discussed and the patient consents to proceed with left hip bipolar hemiarthroplasty.  We discussed the option of percutaneous pinning.  However, I am concerned that the blood supply to the femoral head may be compromised due to the stress fracture and progression over the past 2 weeks.  After discussion " with the patient, we have elected to proceed with left bipolar hemiarthroplasty to allow for early mobilization.  She would like to proceed.  All questions answered.  She has been off of her anticoagulation for over 24 hours.    Date: 1/16/2020  Desmond Mcdonnell MD

## 2020-01-16 NOTE — PLAN OF CARE
Problem: Patient Care Overview  Goal: Plan of Care Review  Flowsheets (Taken 1/16/2020 0226)  Plan of Care Reviewed With: patient;family  Outcome Summary: Pt. presents s/p L bipolar hemiarthroplasty after being diagnosed with a L femoral neck stress fracture. PMH significant for afib, op. Patient lives alone, one step to enter home, no steps inside. Today, pt performed bed mobility with CGA, sit to stand tsf with CGA and ambulated 25 feet with rwx and CGA. Patient has good family support, d/c plans undecided at this time, possibly SNU or home with HH pending progress. Patient demonstrates impairments consisting of generalized post op weakness and pain, decreased activity tolerance and would benefit from skilled PT.

## 2020-01-16 NOTE — ANESTHESIA PREPROCEDURE EVALUATION
Anesthesia Evaluation     Patient summary reviewed                Airway   Mallampati: II  No difficulty expected  Dental      Pulmonary    Cardiovascular     ECG reviewed  Rhythm: regular        Neuro/Psych  GI/Hepatic/Renal/Endo      Musculoskeletal     Abdominal    Substance History      OB/GYN          Other                        Anesthesia Plan    ASA 2     general       Anesthetic plan, all risks, benefits, and alternatives have been provided, discussed and informed consent has been obtained with: patient.  Use of blood products discussed with patient .

## 2020-01-16 NOTE — ANESTHESIA PROCEDURE NOTES
Airway  Urgency: elective    Date/Time: 1/16/2020 8:14 AM  Airway not difficult    General Information and Staff    Patient location during procedure: OR  Anesthesiologist: Bradly Eli MD  CRNA: Crista Vyas CRNA    Indications and Patient Condition  Indications for airway management: airway protection    Preoxygenated: yes  Mask difficulty assessment: 1 - vent by mask    Final Airway Details  Final airway type: endotracheal airway      Successful airway: ETT  Cuffed: yes   Successful intubation technique: direct laryngoscopy  Facilitating devices/methods: intubating stylet  Endotracheal tube insertion site: oral  Blade: Skinner  Blade size: 2  ETT size (mm): 7.0  Cormack-Lehane Classification: grade I - full view of glottis  Placement verified by: chest auscultation and capnometry   Cuff volume (mL): 7  Measured from: gums  ETT/EBT to gums (cm): 19  Number of attempts at approach: 1  Assessment: lips, teeth, and gum same as pre-op and atraumatic intubation

## 2020-01-17 LAB
ANION GAP SERPL CALCULATED.3IONS-SCNC: 12.3 MMOL/L (ref 5–15)
BUN BLD-MCNC: 22 MG/DL (ref 8–23)
BUN/CREAT SERPL: 21.2 (ref 7–25)
CALCIUM SPEC-SCNC: 9 MG/DL (ref 8.6–10.5)
CHLORIDE SERPL-SCNC: 99 MMOL/L (ref 98–107)
CO2 SERPL-SCNC: 26.7 MMOL/L (ref 22–29)
CREAT BLD-MCNC: 1.04 MG/DL (ref 0.57–1)
GFR SERPL CREATININE-BSD FRML MDRD: 50 ML/MIN/1.73
GLUCOSE BLD-MCNC: 112 MG/DL (ref 65–99)
HCT VFR BLD AUTO: 32.4 % (ref 34–46.6)
HGB BLD-MCNC: 11 G/DL (ref 12–15.9)
POTASSIUM BLD-SCNC: 4.1 MMOL/L (ref 3.5–5.2)
SODIUM BLD-SCNC: 138 MMOL/L (ref 136–145)

## 2020-01-17 PROCEDURE — 80048 BASIC METABOLIC PNL TOTAL CA: CPT | Performed by: ORTHOPAEDIC SURGERY

## 2020-01-17 PROCEDURE — 85014 HEMATOCRIT: CPT | Performed by: NURSE PRACTITIONER

## 2020-01-17 PROCEDURE — 97110 THERAPEUTIC EXERCISES: CPT

## 2020-01-17 PROCEDURE — 97150 GROUP THERAPEUTIC PROCEDURES: CPT

## 2020-01-17 PROCEDURE — 85018 HEMOGLOBIN: CPT | Performed by: NURSE PRACTITIONER

## 2020-01-17 RX ORDER — TIZANIDINE 4 MG/1
2 TABLET ORAL EVERY 8 HOURS PRN
Status: DISCONTINUED | OUTPATIENT
Start: 2020-01-17 | End: 2020-01-18 | Stop reason: HOSPADM

## 2020-01-17 RX ADMIN — CYCLOSPORINE 1 DROP: 0.5 EMULSION OPHTHALMIC at 07:32

## 2020-01-17 RX ADMIN — SENNOSIDES AND DOCUSATE SODIUM 1 TABLET: 8.6; 5 TABLET ORAL at 21:02

## 2020-01-17 RX ADMIN — APIXABAN 2.5 MG: 2.5 TABLET, FILM COATED ORAL at 20:47

## 2020-01-17 RX ADMIN — SENNOSIDES AND DOCUSATE SODIUM 1 TABLET: 8.6; 5 TABLET ORAL at 07:32

## 2020-01-17 RX ADMIN — HYDROCODONE BITARTRATE AND ACETAMINOPHEN 2 TABLET: 5; 325 TABLET ORAL at 06:50

## 2020-01-17 RX ADMIN — TIZANIDINE 2 MG: 4 TABLET ORAL at 17:33

## 2020-01-17 RX ADMIN — APIXABAN 2.5 MG: 2.5 TABLET, FILM COATED ORAL at 07:32

## 2020-01-17 RX ADMIN — HYDROCODONE BITARTRATE AND ACETAMINOPHEN 2 TABLET: 5; 325 TABLET ORAL at 02:37

## 2020-01-17 RX ADMIN — TIZANIDINE 2 MG: 4 TABLET ORAL at 09:52

## 2020-01-17 RX ADMIN — CALCIUM CARBONATE-VITAMIN D TAB 500 MG-200 UNIT 500 MG: 500-200 TAB at 07:32

## 2020-01-17 RX ADMIN — HYDROCODONE BITARTRATE AND ACETAMINOPHEN 2 TABLET: 5; 325 TABLET ORAL at 11:46

## 2020-01-17 RX ADMIN — HYDROCODONE BITARTRATE AND ACETAMINOPHEN 2 TABLET: 5; 325 TABLET ORAL at 15:42

## 2020-01-17 RX ADMIN — METOPROLOL TARTRATE 12.5 MG: 25 TABLET ORAL at 20:47

## 2020-01-17 RX ADMIN — HYDROCODONE BITARTRATE AND ACETAMINOPHEN 1 TABLET: 5; 325 TABLET ORAL at 21:47

## 2020-01-17 RX ADMIN — METOPROLOL TARTRATE 12.5 MG: 25 TABLET ORAL at 07:31

## 2020-01-17 NOTE — PLAN OF CARE
Problem: Patient Care Overview  Goal: Plan of Care Review  Outcome: Ongoing (interventions implemented as appropriate)  Flowsheets  Taken 1/16/2020 1845 by Marilia Hidalgo, RN  Progress: improving  Plan of Care Reviewed With: patient  Taken 1/17/2020 0249 by Loli Fontenot, RN  Outcome Summary: VSS during shift. Pain controlled with oral pain medication but is c/o spasms. MD is aware and no new orders. Ice in place. Refusing to get up to use restroom d/t increased spasms. Dressing CDI.

## 2020-01-17 NOTE — PROGRESS NOTES
Orthopedic Progress Note    Subjective :   Met with patient.  She is resting comfortably at this time.  She complained of muscle spasms in her left thigh throughout the night.  She received a one-time dose of Valium.  She reports the muscle spasms have decreased at that time, is more comfortable today.    Objective :    Vital signs in last 24 hours:  Temp:  [97.2 °F (36.2 °C)-98.1 °F (36.7 °C)] 97.2 °F (36.2 °C)  Heart Rate:  [60-86] 80  Resp:  [16-18] 16  BP: (113-165)/(54-87) 113/54  Vitals:    01/16/20 1200 01/16/20 2004 01/16/20 2343 01/17/20 0304   BP: 157/74 129/72 117/66 113/54   BP Location: Right arm Right arm Right arm Left arm   Patient Position: Lying Lying Lying Lying   Pulse: 75 86 71 80   Resp: 16 16 16 16   Temp: 97.2 °F (36.2 °C) 97.3 °F (36.3 °C) 97.3 °F (36.3 °C) 97.2 °F (36.2 °C)   TempSrc: Oral Oral Oral Oral   SpO2: 99% 94% 97% 95%   Weight:       Height:           PHYSICAL EXAM:  Patient is calm, in no acute distress, awake and oriented x 3.  Dressing clean, dry and intact.  No signs of infection.  Swelling is appropriate.  Ecchymosis is appropriate in amount.  Patient is neurovascularly intact distally.  EHL, FHL, GS and TA intact.    LABS:  Results from last 7 days   Lab Units 01/15/20  2020   WBC 10*3/mm3 9.95   HEMOGLOBIN g/dL 13.1   HEMATOCRIT % 39.3   PLATELETS 10*3/mm3 253     Results from last 7 days   Lab Units 01/15/20  2020   SODIUM mmol/L 136   POTASSIUM mmol/L 3.7   CHLORIDE mmol/L 96*   CO2 mmol/L 27.0   BUN mg/dL 25*   CREATININE mg/dL 1.00   GLUCOSE mg/dL 94   CALCIUM mg/dL 10.2     Results from last 7 days   Lab Units 01/15/20  2020   INR  1.08       ASSESSMENT:  Status post left hip Bipolar hemiarthroplasty, POD #1    Plan:  Continue Physical Therapy  Weight bearing as tolerated  Ordered H/H for AM lab draws  Continue SCDs, Continue Eliquis 2.5 mg BID for DVT prophylaxis.  Dispo planning for SNF when medically stable. Plan to meet with social work today regarding placement.      Edel Wiggins, APRN    Date: 1/17/2020  Time: 7:22 AM

## 2020-01-17 NOTE — THERAPY TREATMENT NOTE
Acute Care - Physical Therapy Treatment Note  [facility]     Patient Name: Bernie Thomas  : 1932  MRN: [id number]  Today's Date: 2020             Admit Date: 1/15/2020    Visit Dx:  No diagnosis found.  Patient Active Problem List   Diagnosis   • Left displaced femoral neck fracture (CMS/HCC)       Therapy Treatment    Rehabilitation Treatment Summary     Row Name 20 1317 20 1200          Treatment Time/Intention    Discipline  physical therapy assistant  -  physical therapy assistant  -     Document Type  therapy note (daily note)  -  therapy note (daily note)  -     Subjective Information  complains of;fatigue;weakness;pain  -  complains of;weakness;fatigue;pain  -     Care Plan Review  patient/other agree to care plan  -  patient/other agree to care plan  -     Care Plan Review, Other Participant(s)  daughter  -  daughter  -     Comment  still very slow paced, fearful of muscle spasms and pain  -2  needs extra time to complete task  -     Existing Precautions/Restrictions  fall;hip, posterior;left  -JM  fall;hip, posterior;left  -JM     Treatment Considerations/Comments  pt screamed at PT staff while perf HS before even touched her leg-had a spasm and LLE jerked-asked me to get off her and not touch her again, fam present and aware PT staff had not moved leg yet had perf 10, rested and would need A to attempt other 10 HS  -2  --     Recorded by [JM] Caroline Skinner, VASYL 20 1319  [2] Caroline Skinner, [facility] 20 1550 [JM] Caroline Skinner, PTA 20 1246     Row Name 20 1317 20 1200          Sit-Stand Transfer    Sit-Stand [place] (Transfers)  minimum assist (75% patient effort);verbal cues;nonverbal cues (demo/gesture) extremely slow paced  -  minimum assist (75% patient effort);verbal cues;nonverbal cues (demo/gesture)  -     Assistive Device (Sit-Stand Transfers)  walker, front-wheeled  -2  walker, front-wheeled  -      Recorded by [JM] Caroline Skinner, [facility] 01/17/20 1550  [JM2] Caroline Skinner, [facility] 01/17/20 1319 [JM] Caroline Skinner, [facility] 01/17/20 1246     Row Name 01/17/20 1317 01/17/20 1200          Gait/Stairs Assessment/Training    [place] Level (Gait)  contact guard;verbal cues  -  contact guard;verbal cues  -     Assistive Device (Gait)  walker, front-wheeled  -  walker, front-wheeled  -     Distance in Feet (Gait)  15 to BR and back  -JM2  20  -JM     Deviations/Abnormal Patterns (Gait)  lisa decreased;stride length decreased  -  lisa decreased;stride length decreased  -     Bilateral Gait Deviations  forward flexed posture;weight shift ability decreased  -  forward flexed posture;weight shift ability decreased  -     Comment (Gait/Stairs)  sequencing and step length cues required  -2  several sequencing cues req  -JM     Recorded by [JM] Caroline Skinner, [facility] 01/17/20 1319  [JM2] Caroline Skinner, [facility] 01/17/20 1550 [JM] Caroline Skinner, [facility] 01/17/20 1246     Row Name 01/17/20 1317 01/17/20 1200          Therapeutic Exercise    Comment (Therapeutic Exercise)  THR protocol x 10-20 reps , rest in between to amb to BR  -JM  THR protocol x 15 reps  -JM     Recorded by [JM] Caroline Skinner, [facility] 01/17/20 1550 [JM] Caroline Skinner, [facility] 01/17/20 1246     Row Name 01/17/20 1317 01/17/20 1200          Positioning and Restraints    Pre-Treatment Position  sitting in chair/recliner  -JM  sitting in chair/recliner  -JM     In Chair  reclined;call light within reach;encouraged to call for assist;exit alarm on;with family/caregiver  -JM  reclined;call light within reach;encouraged to call for assist;exit alarm on;with family/caregiver  -JM     Recorded by [JM] Caroline Skinner, [facility] 01/17/20 1550 [JM] Caroline Skinner, [facility] 01/17/20 1246     Row Name 01/17/20 1317 01/17/20 1200          Pain Scale: Numbers Pre/Post-Treatment    Pain Scale: Numbers, Pretreatment  8/10  -JM  4/10  -JM     Pain Location -  Side  Left  -JM2  Left  -JM     Pain Location  hip  -JM2  hip  -JM     Pain Intervention(s)  Medication (See MAR);Repositioned;Cold applied  -JM2  Medication (See MAR);Repositioned  -JM     Recorded by [JM] Caroline Skinner PTA 01/17/20 1550  [JM2] Caroline Skinner, VASYL 01/17/20 1319 [JM] Caroline Skinner, VASYL 01/17/20 1246     Row Name                Wound 01/16/20 0949 Left lateral hip Incision    Wound - Properties Group Date first assessed: 01/16/20 [AS] Time first assessed: 0949 [AS] Side: Left [AS] Orientation: lateral [AS] Location: hip [AS] Primary Wound Type: Incision [AS2] Additional Comments: telesteban fraustoaderm [AS2] Recorded by:  [AS] Jocy Dejesus, RN 01/16/20 0949 [AS2] Jocy Dejesus RN 01/16/20 0953      User Key  (r) = Recorded By, (t) = Taken By, (c) = Cosigned By    Initials Name Effective Dates Discipline     Caroline Skinner, PTA 03/07/18 -  PT    AS Jocy Dejesus, RN 06/16/16 -  Nurse          Wound 01/16/20 0949 Left lateral hip Incision (Active)   Dressing Appearance dry;intact;no drainage 1/17/2020  8:00 AM   Closure TARA 1/17/2020  8:00 AM   Base dressing in place, unable to visualize 1/17/2020  8:00 AM               PT Recommendation and Plan     Plan of Care Reviewed With: patient, daughter, grandchild(elsa)  Outcome Summary: pt had a moment of anger as she was in pain and having spasms, family present ; feel pt is more appropriate at this date for SNU at [place], [facility] in agreement; slow paced gt, apprehensive due to spasms and pain  Outcome Measures     Row Name 01/17/20 1200             How much help from another person do you currently need...    Turning from your back to your side while in flat bed without using bedrails?  3  -JM      Moving from lying on back to sitting on the side of a flat bed without bedrails?  2  -JM      Moving to and from a bed to a chair (including a wheelchair)?  3  -JM      Standing up from a chair using your arms (e.g., wheelchair, bedside chair)?  3  -JM       Climbing 3-5 steps with a railing?  2  -JM      To walk in hospital room?  3  -JM      AM-PAC 6 Clicks Score (PT)  16  -        User Key  (r) = Recorded By, (t) = Taken By, (c) = Cosigned By    Initials Name Provider Type    Caroline Pérez PTA Physical Therapy Assistant         Time Calculation:   PT Charges     Row Name 01/17/20 1555 01/17/20 1253          Time Calculation    Start Time  1310  -JALEEL  0900  -JALEEL     Stop Time  1401  -  0940  -     Time Calculation (min)  51 min  -  40 min  -     PT Received On  01/17/20  -JALEEL  01/17/20  -JALEEL     PT - Next Appointment  01/18/20  -JALEEL  01/17/20  -JALEEL        Time Calculation- PT    Total Timed Code Minutes- PT  45 minute(s)  -JM  23 minute(s)  -JALEEL       User Key  (r) = Recorded By, (t) = Taken By, (c) = Cosigned By    Initials Name Provider Type    Caroline Pérez PTA Physical Therapy Assistant        Therapy Charges for Today     Code Description Service Date Service Provider Modifiers Qty    59809307992 HC PT THER PROC EA 15 MIN 1/17/2020 Caroline Skinner PTA GP 2    64728557507 HC PT THER PROC GROUP 1/17/2020 Caroline Skinner PTA GP 1    64857003032 HC PT THER PROC EA 15 MIN 1/17/2020 Caroline Skinner PTA GP 3          PT G-Codes  Outcome Measure Options: AM-PAC 6 Clicks Basic Mobility (PT)  AM-PAC 6 Clicks Score (PT): 16    Caroline Skinner PTA  1/17/2020

## 2020-01-17 NOTE — PLAN OF CARE
Problem: Patient Care Overview  Goal: Plan of Care Review  Outcome: Ongoing (interventions implemented as appropriate)  Flowsheets (Taken 1/17/2020 1246)  Plan of Care Reviewed With: patient; daughter  Outcome Summary: pt natalia short dist amb w/cues for each step, needs extra time to complete task but very agreeable; SNU vs HOME at current level of assist; will trial bed mob next session if able

## 2020-01-17 NOTE — PROGRESS NOTES
Continued Stay Note  [facility]     Patient Name: Bernie Thomas  MRN: [id number]  Today's Date: 1/17/2020    Admit Date: 1/15/2020    Discharge Plan     Row Name 01/17/20 1713       Plan    Plan  Skilled bed at [facility]; bed available tomorrow     Patient/Family in Agreement with Plan  yes    Plan Comments  CCP met with patient and patient's daughters at bedside. Patient and family chose [facility]. CCP spoke with [facility]; bed available Saturday. Family to transport.         Discharge Codes    No documentation.             MAYRA Figueroa

## 2020-01-17 NOTE — THERAPY TREATMENT NOTE
Acute Care - Physical Therapy Treatment Note  [facility]     Patient Name: Bernie Thomas  : 1932  MRN: [id number]  Today's Date: 2020             Admit Date: 1/15/2020    Visit Dx:  No diagnosis found.  Patient Active Problem List   Diagnosis   • Left displaced femoral neck fracture (CMS/HCC)       Therapy Treatment    Rehabilitation Treatment Summary     Row Name 20 1200             Treatment Time/Intention    Discipline  physical therapy assistant  -      Document Type  therapy note (daily note)  -      Subjective Information  complains of;weakness;fatigue;pain  -      Care Plan Review  patient/other agree to care plan  -      Care Plan Review, Other Participant(s)  daughter  -      Comment  needs extra time to complete task  -      Existing Precautions/Restrictions  fall;hip, posterior;left  -      Recorded by [] Caroline Skinner PTA 20 1246      Row Name 20 1200             Sit-Stand Transfer    Sit-Stand [place] (Transfers)  minimum assist (75% patient effort);verbal cues;nonverbal cues (demo/gesture)  -      Assistive Device (Sit-Stand Transfers)  walker, front-wheeled  -      Recorded by [] Caroline Skinner PTA 20 1246      Row Name 20 1200             Gait/Stairs Assessment/Training    [place] Level (Gait)  contact guard;verbal cues  -      Assistive Device (Gait)  walker, front-wheeled  -      Distance in Feet (Gait)  20  -      Deviations/Abnormal Patterns (Gait)  lisa decreased;stride length decreased  -      Bilateral Gait Deviations  forward flexed posture;weight shift ability decreased  -      Comment (Gait/Stairs)  several sequencing cues req  -      Recorded by [JALEEL] Caroline Skinner PTA 20 1246      Row Name 20 1200             Therapeutic Exercise    Comment (Therapeutic Exercise)  THR protocol x 15 reps  -      Recorded by [JALEEL] Caroline Skinner PTA 20 1246      Row Name 20  1200             Positioning and Restraints    Pre-Treatment Position  sitting in chair/recliner  -JM      In Chair  reclined;call light within reach;encouraged to call for assist;exit alarm on;with family/caregiver  -      Recorded by [JALEEL] Caroline Skinner PTA 01/17/20 1246      Row Name 01/17/20 1200             Pain Scale: Numbers Pre/Post-Treatment    Pain Scale: Numbers, Pretreatment  4/10  -JM      Pain Location - Side  Left  -      Pain Location  hip  -      Pain Intervention(s)  Medication (See MAR);Repositioned  -      Recorded by [JALEEL] Caroline Skinner PTA 01/17/20 1246      Row Name                Wound 01/16/20 0949 Left lateral hip Incision    Wound - Properties Group Date first assessed: 01/16/20 [AS] Time first assessed: 0949 [AS] Side: Left [AS] Orientation: lateral [AS] Location: hip [AS] Primary Wound Type: Incision [AS2] Additional Comments: telfa, tegaderm [AS2] Recorded by:  [AS] Jocy Dejesus, RN 01/16/20 0949 [AS2] Jocy Dejesus, RN 01/16/20 0953      User Key  (r) = Recorded By, (t) = Taken By, (c) = Cosigned By    Initials Name Effective Dates Discipline     Caroline Skinner PTA 03/07/18 -  PT    AS Jocy Dejesus, RN 06/16/16 -  Nurse          Wound 01/16/20 0949 Left lateral hip Incision (Active)   Dressing Appearance dry;intact;no drainage 1/17/2020  8:00 AM   Closure TARA 1/17/2020  8:00 AM   Base dressing in place, unable to visualize 1/17/2020  8:00 AM   Dressing Care, Wound gauze, antimicrobial 1/16/2020  2:31 PM               PT Recommendation and Plan     Plan of Care Reviewed With: patient, daughter  Outcome Summary: pt natalia short dist amb w/cues for each step, needs extra time to complete task but very agreeable; SNU vs HOME at current level of assist; will trial bed mob next session if able  Outcome Measures     Row Name 01/17/20 1200             How much help from another person do you currently need...    Turning from your back to your side while in flat bed  without using bedrails?  3  -JM      Moving from lying on back to sitting on the side of a flat bed without bedrails?  2  -JM      Moving to and from a bed to a chair (including a wheelchair)?  3  -JM      Standing up from a chair using your arms (e.g., wheelchair, bedside chair)?  3  -JM      Climbing 3-5 steps with a railing?  2  -JM      To walk in hospital room?  3  -JM      AM-PAC 6 Clicks Score (PT)  16  -        User Key  (r) = Recorded By, (t) = Taken By, (c) = Cosigned By    Initials Name Provider Type    Caroline Pérez PTA Physical Therapy Assistant         Time Calculation:   PT Charges     Row Name 01/17/20 1253             Time Calculation    Start Time  0900  -JALEEL      Stop Time  0940  -JALEEL      Time Calculation (min)  40 min  -JALEEL      PT Received On  01/17/20  -JALEEL      PT - Next Appointment  01/17/20  -JALEEL         Time Calculation- PT    Total Timed Code Minutes- PT  23 minute(s)  -JALEEL        User Key  (r) = Recorded By, (t) = Taken By, (c) = Cosigned By    Initials Name Provider Type    Caroline Pérez PTA Physical Therapy Assistant        Therapy Charges for Today     Code Description Service Date Service Provider Modifiers Qty    93563404617 HC PT THER PROC EA 15 MIN 1/17/2020 Caroline Skinner PTA GP 2    78751244494 HC PT THER PROC GROUP 1/17/2020 Caroline Skinner PTA GP 1          PT G-Codes  Outcome Measure Options: AM-PAC 6 Clicks Basic Mobility (PT)  AM-PAC 6 Clicks Score (PT): 16    Caroline Skinner PTA  1/17/2020

## 2020-01-17 NOTE — PLAN OF CARE
Pt POD 1 L Hip Bi-polar. Assist x1 for transfers. Pain controlled with Norco and Zanaflex. Voiding well. VSS. RA. IV S/L. WILBERT C/D/I. Plans for home vs SNU at d/c.

## 2020-01-17 NOTE — PLAN OF CARE
Problem: Patient Care Overview  Goal: Plan of Care Review  1/17/2020 1550 by Caroline Skinner PTA  Outcome: Ongoing (interventions implemented as appropriate)  Flowsheets (Taken 1/17/2020 1550)  Plan of Care Reviewed With: patient; daughter; grandchild(elsa)  Outcome Summary: pt had a moment of anger as she was in pain and having spasms, family present ; feel pt is more appropriate at this date for SNU at [place], fam in agreement; slow paced gt, apprehensive due to spasms and pain  1/17/2020 1246 by Caroline Skinner PTA  Outcome: Ongoing (interventions implemented as appropriate)  Flowsheets (Taken 1/17/2020 1246)  Plan of Care Reviewed With: patient;daughter  Outcome Summary: pt natalia short dist amb w/cues for each step, needs extra time to complete task but very agreeable; SNU vs HOME at current level of assist; will trial bed mob next session if able

## 2020-01-18 VITALS
WEIGHT: 132.6 LBS | OXYGEN SATURATION: 95 % | DIASTOLIC BLOOD PRESSURE: 62 MMHG | RESPIRATION RATE: 16 BRPM | SYSTOLIC BLOOD PRESSURE: 116 MMHG | HEIGHT: 62 IN | HEART RATE: 71 BPM | TEMPERATURE: 99 F | BODY MASS INDEX: 24.4 KG/M2

## 2020-01-18 PROBLEM — I48.91 ATRIAL FIBRILLATION: Status: ACTIVE | Noted: 2020-01-18

## 2020-01-18 LAB
HCT VFR BLD AUTO: 29.5 % (ref 34–46.6)
HGB BLD-MCNC: 9.7 G/DL (ref 12–15.9)

## 2020-01-18 PROCEDURE — 85018 HEMOGLOBIN: CPT | Performed by: NURSE PRACTITIONER

## 2020-01-18 PROCEDURE — 85014 HEMATOCRIT: CPT | Performed by: NURSE PRACTITIONER

## 2020-01-18 RX ADMIN — TIZANIDINE 2 MG: 4 TABLET ORAL at 11:32

## 2020-01-18 RX ADMIN — HYDROCODONE BITARTRATE AND ACETAMINOPHEN 1 TABLET: 5; 325 TABLET ORAL at 08:07

## 2020-01-18 RX ADMIN — HYDROCODONE BITARTRATE AND ACETAMINOPHEN 1 TABLET: 5; 325 TABLET ORAL at 08:12

## 2020-01-18 RX ADMIN — HYDROCODONE BITARTRATE AND ACETAMINOPHEN 2 TABLET: 5; 325 TABLET ORAL at 13:58

## 2020-01-18 RX ADMIN — APIXABAN 2.5 MG: 2.5 TABLET, FILM COATED ORAL at 08:08

## 2020-01-18 RX ADMIN — HYDROCODONE BITARTRATE AND ACETAMINOPHEN 2 TABLET: 5; 325 TABLET ORAL at 02:04

## 2020-01-18 RX ADMIN — CALCIUM CARBONATE-VITAMIN D TAB 500 MG-200 UNIT 500 MG: 500-200 TAB at 08:08

## 2020-01-18 RX ADMIN — METOPROLOL TARTRATE 12.5 MG: 25 TABLET ORAL at 08:08

## 2020-01-18 RX ADMIN — SENNOSIDES AND DOCUSATE SODIUM 1 TABLET: 8.6; 5 TABLET ORAL at 08:08

## 2020-01-18 RX ADMIN — TIZANIDINE 2 MG: 4 TABLET ORAL at 02:04

## 2020-01-18 RX ADMIN — CYCLOSPORINE 1 DROP: 0.5 EMULSION OPHTHALMIC at 08:08

## 2020-01-18 NOTE — PLAN OF CARE
Problem: Patient Care Overview  Goal: Plan of Care Review  Outcome: Ongoing (interventions implemented as appropriate)  Flowsheets (Taken 1/18/2020 0017)  Progress: improving  Plan of Care Reviewed With: patient  Outcome Summary: Pt stand and pivoting to the bsc, pt states it's dificult to move due to painful muscle spasms. VSS. Voiding fx is intact. Pain is controlled with po meds. Pt educated on fall prevention and pulmonary toilet. Pt d/c when medically stable.

## 2020-01-18 NOTE — DISCHARGE SUMMARY
Discharge Summary    Date of Admission: 1/15/2020  6:59 PM    Date of Discharge:  1/18/2020    Discharge Diagnosis:   Left displaced femoral neck fracture (CMS/Formerly [facility]) [S72.002A]      PMHX:   Past Medical History:   Diagnosis Date   • A-fib (CMS/[facility])    • A-fib (CMS/Formerly [facility])    • Osteoporosis        Discharge Disposition  Skilled Nursing Facility ([facility] - External)    Procedures Performed  Procedure(s):  LEFT HIP BI-POLAR       Indication for Admission  Patient is a 87 y.o. female admitted after undergoing the above surgical procedure. They were admitted for post-operative pain control, medical management and physical therapy.  They progressed with physical therapy.  They were deemed stable for discharge.      Consults:   Consults     No orders found from 12/17/2019 to 1/16/2020.          Discharge Instructions:  Patient is weight bearing as tolerated on the operative leg.  Patient has posterior hip dislocation precautions in effect for 6 weeks post-op. Patient is to progress ambulation as tolerated.  Use walker as needed for stability and gait.  May progress to cane as tolerated.  The dressing is waterproof, and the patient may shower.  Keep dressing in place at least 7 days. May change dressing before saturated or starts to fall off.  Patient will follow-up in the office in 10-14 days. Home health physical therapy will follow patient once patient is discharged home.   Call the office at [phone] for any questions or concerns.      Discharge Medications     Discharge Medications      Continue These Medications      Instructions Start Date   apixaban 2.5 MG tablet tablet  Commonly known as:  ELIQUIS   2.5 mg, Oral, 2 Times Daily      calcium citrate-vitamin d 200-250 MG-UNIT tablet tablet  Commonly known as:  CITRACAL   Oral, Daily      cycloSPORINE 0.05 % ophthalmic emulsion  Commonly known as:  RESTASIS   1 drop, Both Eyes, 2 Times Daily      HYDROcodone-acetaminophen 5-325 MG per tablet  Commonly known as:  NORCO    1 tablet, Oral, Every 4 Hours PRN      metoprolol tartrate 25 MG tablet  Commonly known as:  LOPRESSOR   12.5 mg, Oral, 2 Times Daily             Discharge Diet:   Diet Instructions     REGULAR                Activity at Discharge:   Activity Instructions     AS TOLERATED                Follow-up Appointments  No future appointments.    Follow-up in Dr. Mcdonnell's office in 2 weeks.    Test Results Pending at Discharge       Sanju Castellanos PA-C  01/18/20,  1:31 PM

## 2020-01-18 NOTE — PROGRESS NOTES
Orthopedic Progress Note    Subjective :   Patient seen and examined. Resting comfortably. No issues overnight. Pain controlled. Walked yesterday with PT. Maintains posterior hip precautions.  Plan for [facility] to [facility] today.  Muscle spasms improving today.    Objective :    Vital signs in last 24 hours:  Temp:  [96.5 °F (35.8 °C)-99 °F (37.2 °C)] 99 °F (37.2 °C)  Heart Rate:  [63-77] 71  Resp:  [16-18] 16  BP: (100-132)/(55-71) 116/62  Vitals:    01/17/20 2245 01/18/20 0252 01/18/20 0700 01/18/20 1100   BP: 100/55 105/58 132/71 116/62   BP Location: Left arm Left arm Left arm Left arm   Patient Position: Lying Lying Lying Sitting   Pulse: 64 63 66 71   Resp: 16 16 16 16   Temp: 97.3 °F (36.3 °C) 96.5 °F (35.8 °C) 97.5 °F (36.4 °C) 99 °F (37.2 °C)   TempSrc: Oral Oral Oral Oral   SpO2: 92% 90% 96% 95%   Weight:       Height:           PHYSICAL EXAM:  Patient is calm, in no acute distress, awake and oriented x 3.  Dressing clean, dry and intact.  No signs of infection.  Swelling is appropriate.  Ecchymosis is appropriate in amount.  Homans test is negative.  Patient is neurovascularly intact distally.  Compartments are soft  EHL,FHL,TA,GS are intact  DP/TP 2+    LABS:  Results from last 7 days   Lab Units 01/18/20  0257  01/15/20  2020   WBC 10*3/mm3  --   --  9.95   HEMOGLOBIN g/dL 9.7*   < > 13.1   HEMATOCRIT % 29.5*   < > 39.3   PLATELETS 10*3/mm3  --   --  253    < > = values in this interval not displayed.     Results from last 7 days   Lab Units 01/17/20  0824   SODIUM mmol/L 138   POTASSIUM mmol/L 4.1   CHLORIDE mmol/L 99   CO2 mmol/L 26.7   BUN mg/dL 22   CREATININE mg/dL 1.04*   GLUCOSE mg/dL 112*   CALCIUM mg/dL 9.0     Results from last 7 days   Lab Units 01/15/20  2020   INR  1.08       ASSESSMENT:  Status post left bipolar hemiarthroplasty, POD #2    Plan:  Continue Physical Therapy, WBAT. Strict posterior hip precautions.   Continue SCDs, Continue Eliquis 2.5 mg twice daily  for DVT prophylaxis   dispo planning for rehab today.  Follow-up in Dr. Mcdonnell's office in 2 weeks.

## 2020-01-18 NOTE — DISCHARGE INSTRUCTIONS
"    Dr. Desmond Mcdonnell Hip Joint Replacement Discharge Instructions:  Office Phone Number: [phone]    \"I would like to thank you for the opportunity for trusting me in taking care of you during your recent surgical procedure. I do not take that trust lightly, and I look forward and would be honored any opportunity in providing future orthopedic care to you or your family.\"    Desmond Mcdonnell M.D.     I. ACTIVITIES:  1. Exercises:  ? Complete exercise program as taught by the hospital physical therapist 2 times per day.  You may wean off the walker to a cane when directed by the physical therapist.  ? Exercise program will be advanced by the physical therapist  ? During the day be up ambulating every 2 hours (while awake) for short distances  ? Complete the ankle pump exercises at least 10 times per hour (while awake)  ? Elevate legs most of the day the first week post operatively and thereafter elevate legs when in bed and for at least 30 minutes during the day. Use cold packs 20-30 minutes approximately 5 times per day. This should be done before and after completing your exercises and at any time you are experiencing pain/ stiffness in your operative extremity.      2. Activities of Daily Living:  ? No tub baths, hot tubs, or swimming pools for 4 weeks  ? The clear dressing with thin white gauze strip dressing is waterproof.  You may shower without covering the dressing.  After 7 days you may remove the dressing.  After dressing removal, do not scrub or rub the incision. Allow skin glue to fall off over the next few weeks.  After the dressing is removed, simply let the water run over the incision and pat dry.    II. Restrictions  ? Follow any movement restrictions that was discussed with you by either Dr. Mcdonnell or the physical therapist. Maintain strict posterior hip precautions and abduction pillow.   ? Dr. Mcdonnell will discuss with you when you will be able to drive again at your first post-op " appointment.  ? Weight bearing is as tolerated.  ? First week stay inside on even terrain. May go up and down stairs one stair at a time utilizing the hand rail.  ? Once you feel confident, you may venture outside.    III. Precautions:  ? Everyone that comes near you should wash their hands  ? No elective dental, genital-urinary, or colon procedures or surgical procedures for 12 weeks after surgery unless absolutely necessary.  ?  If dental work or surgical procedure is deemed absolutely necessary within 12 weeks of surgery, you will need to contact Dr. Witt office as you will need to take antibiotics 1 hour prior to any dental work (including teeth cleanings).  ? Dr. Mcdonnell will prescribe prophylactic antibiotics for all dental procedures for one year  as a precautionary measure to minimize risk of infection.  If you are a diabetic or take immunosuppressive medication, you may have to take prophylactic antibiotics the remainder of your life before dental work.    ? Avoid sick people. If you must be around someone who is ill, they should wear a mask.  ? Avoid visits to the Emergency Room or Urgent Care unless you are having a life threatening event.   ? Dr. Mcdonnell does not routinely place on stockings, but if you are having swelling in your feet or ankle then you may obtain stockings from your local pharmacy or [facility].   Stockings are to be placed on in the morning and removed at night. Monitor the stockings to ensure that any swelling is not causing the stockings to become too tight. In this case, remove stockings immediately.    IV. INCISION CARE:  ? Dr. Mcdonnell takes great care in closing your incision to give you the best opportunity for a healthy incision with minimal scarring. He places sutures below the skin surface that will eventually dissolve.  The incision is then covered with a skin glue which makes the incision water tight, and minimizes bleeding onto the dressing.  No staples are used.   Occasionally one of the buried stitches may come to the skin surface and may need to be removed.  Please resist the temptation of removing the stitch by yourself.  Dr. Mcdonnell will be happy to remove it for you.  Bruising around the incision and thigh is normal and to be expected.  Please keep dressing in place at least until post-op day 7. You may remove and replace dressing before day 7 if the dressing begins to fall off or becomes saturated. Wash your hands and under your finger nails prior to dressing changes.  After day 7 as long as incision is dry and intact, you may leave the dressing off and open to air.  You will need to find underwear that does not rub on the incision for a few weeks after the surgery.  You may find it more comfortable to place a dressing on to keep your underwear from rubbing the incision.       ? If dressing must be changed, utilize dry gauze and paper tape. Avoid touching the side of the gauze that goes against the incision with your hands.  ? No creams or ointments to the incision until permission given by .  ? Do not touch or pick at the incision, or try to remove any sutures or skin glue.  ? Check dressing every day and notify surgeon immediately if any of the following signs or symptoms are noted:  o Increase in redness  o Increase in swelling of the entire extremity that does not go away with elevation.  Notify office that you may have a blood clot.    o Drainage oozing from the incision  o Pulling apart of the edges of the incision  o Increase in overall body temperature (greater than 100.5 degrees)    V. Medications:   1. Anticoagulants: You will be discharged on an anticoagulant. This is a prophylactic medication that helps prevent blood clots during your post-operative period. The type and length of dosage varies based on your individual needs, procedure performed, and ' preference.  ? While taking the anticoagulant, you should avoid taking any additional  aspirin than what is prescribed.   ? Notify surgeon immediately if any viji bleeding is noted in the urine, stool, emesis, or from the nose or the incision. Blood in the stool will often appear as black rather than red. Blood in urine may appear as pink. Blood in emesis may appear as brown/black like coffee grounds.  ? You will need to apply pressure for longer periods of time to any cuts or abrasions to stop bleeding  ? Avoid alcohol while taking anticoagulants.    2. Stool Softeners: You will be at greater risk of constipation after surgery due to being less mobile and the pain medications.   ? Take stool softeners as instructed by your surgeon while on pain medications. Over the counter Colace 100 mg 1-2 capsules twice daily.   ? If stools become too loose or too frequent, please decreases the dosage or stop the stool softener.  ? If constipation occurs despite use of stool softeners, you are to continue the stool softeners and add a laxative (Milk of Magnesia 1 ounce daily as needed).  If no bowel movement occurs past 3 days, then purchase Magnesium citrate and drink 1/2 bottle every 8 hours (on ice tastes better) until success. If no bowel movement by post-operative day 5 please call Dr. Matt office for further instructions.   You may need to decrease or stop your pain medications if bowel movements to not occur.     ? Drink plenty of fluids, and eat fruits and vegetables during your recovery time.    3. Pain Medications utilized after surgery are narcotics and the law requires that the following information be given to all patients that are prescribed narcotics:  ? CLASSIFICATION: Pain medications are called Opioids and are narcotics  ? LEGALITIES: It is illegal to share narcotics with others and to drive within 24 hours of taking narcotics  ? POTENTIAL SIDE EFFECTS: Potential side effects of opioids include: nausea, vomiting, itching, dizziness, drowsiness, dry mouth, constipation, and difficulty  "urinating.  ? POTENTIAL ADVERSE EFFECTS:   o Opioid tolerance can develop with use of pain medications and this simply means that it requires more and more of the medication to control pain; however, this is seen more in patients that use opioids for longer periods of time.  o Opioid dependence can develop with use of Opioids and this simply means that to stop the medication can cause withdrawal symptoms; however, this is seen with patients that use Opioids for longer periods of time.  o Opioid addiction can develop with use of Opioids and the incidence of this is very unlikely in patients who take the medications as ordered and stop the medications as instructed.  o Opioid overdose can be dangerous, but is unlikely when the medication is taken as ordered and stopped when ordered. It is important not to mix opioids with alcohol or with and type of sedative such as Benadryl as this can lead to over sedation and respiratory difficulty.  ? DOSAGE:   o Pain medications will need to be taken consistently for the first few days to decrease pain and promote adequate pain relief and participation in physical therapy.  o After the initial surgical pain begins to resolve, you may begin to decrease the pain medication. By the end of 6 weeks, you should be off of pain medications except for before physical therapy or to help with pain when attempting to fall asleep.  Pain medications will be tapered to lesser dosages as you are further from your surgical day.  No pain medications will be provided after 3 months from surgery.     o Refills will not be given by the office during evening hours, or weekends.  o To seek refills on pain medications during the post-operative period, you must call the office 48 hours in advance to request the refill. The office will then notify you when to  the prescription. DO NOT wait until you are out of the medication to request a refill.  They can not be \"called in\" to the pharmacy.      V. " FOLLOW-UP VISITS:  ? You will need to follow up in the office with Dr. Mcdonnell in 2 weeks.  Please call [phone] if you need to confirm or reschedule your appointment time.   ? If you have any concerns or suspected complications prior to your follow up visit, please call your surgeons office. Do not wait until your appointment time if you suspect complications. These will need to be addressed in the office promptly.

## 2020-01-20 NOTE — PROGRESS NOTES
Case Management Discharge Note      Final Note: [facility]          Destination - Selection Complete      Service Provider Request Status Selected Services Address Phone Number Fax Number    [facility] [address] [facility] [id number] [phone]      Durable Medical Equipment      No service has been selected for the patient.      Dialysis/Infusion      No service has been selected for the patient.      Home Medical Care      No service has been selected for the patient.      Therapy      No service has been selected for the patient.      Community Resources      No service has been selected for the patient.             Final Discharge Disposition Code: 03 - skilled nursing facility (SNF)

## 2020-09-01 ENCOUNTER — OFFICE (OUTPATIENT)
Dept: URBAN - METROPOLITAN AREA CLINIC 64 | Facility: CLINIC | Age: 85
End: 2020-09-01

## 2020-09-01 VITALS
DIASTOLIC BLOOD PRESSURE: 68 MMHG | HEART RATE: 75 BPM | SYSTOLIC BLOOD PRESSURE: 131 MMHG | HEIGHT: 62 IN | WEIGHT: 104 LBS

## 2020-09-01 DIAGNOSIS — R07.89 OTHER CHEST PAIN: ICD-10-CM

## 2020-09-01 DIAGNOSIS — R14.2 ERUCTATION: ICD-10-CM

## 2020-09-01 DIAGNOSIS — R13.10 DYSPHAGIA, UNSPECIFIED: ICD-10-CM

## 2020-09-01 PROCEDURE — 99203 OFFICE O/P NEW LOW 30 MIN: CPT | Performed by: NURSE PRACTITIONER

## 2020-09-22 ENCOUNTER — ON CAMPUS - OUTPATIENT (OUTPATIENT)
Dept: URBAN - METROPOLITAN AREA HOSPITAL 2 | Facility: HOSPITAL | Age: 85
End: 2020-09-22

## 2020-09-22 VITALS
OXYGEN SATURATION: 100 % | SYSTOLIC BLOOD PRESSURE: 146 MMHG | RESPIRATION RATE: 19 BRPM | SYSTOLIC BLOOD PRESSURE: 116 MMHG | RESPIRATION RATE: 16 BRPM | HEART RATE: 84 BPM | SYSTOLIC BLOOD PRESSURE: 117 MMHG | SYSTOLIC BLOOD PRESSURE: 100 MMHG | TEMPERATURE: 97.6 F | HEART RATE: 89 BPM | DIASTOLIC BLOOD PRESSURE: 55 MMHG | SYSTOLIC BLOOD PRESSURE: 135 MMHG | HEART RATE: 19 BPM | HEART RATE: 85 BPM | HEART RATE: 72 BPM | DIASTOLIC BLOOD PRESSURE: 54 MMHG | DIASTOLIC BLOOD PRESSURE: 68 MMHG | RESPIRATION RATE: 18 BRPM | DIASTOLIC BLOOD PRESSURE: 66 MMHG | OXYGEN SATURATION: 98 % | HEIGHT: 62 IN | SYSTOLIC BLOOD PRESSURE: 114 MMHG | DIASTOLIC BLOOD PRESSURE: 65 MMHG | HEART RATE: 82 BPM

## 2020-09-22 DIAGNOSIS — K22.2 ESOPHAGEAL OBSTRUCTION: ICD-10-CM

## 2020-09-22 DIAGNOSIS — R07.89 OTHER CHEST PAIN: ICD-10-CM

## 2020-09-22 DIAGNOSIS — K44.9 DIAPHRAGMATIC HERNIA WITHOUT OBSTRUCTION OR GANGRENE: ICD-10-CM

## 2020-09-22 DIAGNOSIS — R13.10 DYSPHAGIA, UNSPECIFIED: ICD-10-CM

## 2020-09-22 PROCEDURE — 43235 EGD DIAGNOSTIC BRUSH WASH: CPT | Performed by: INTERNAL MEDICINE

## 2020-09-22 PROCEDURE — 43450 DILATE ESOPHAGUS 1/MULT PASS: CPT | Performed by: INTERNAL MEDICINE

## 2020-09-22 RX ORDER — OMEPRAZOLE 40 MG/1
40 CAPSULE, DELAYED RELEASE ORAL
Qty: 30 | Refills: 0 | Status: ACTIVE
Start: 2020-09-22

## 2021-07-31 ENCOUNTER — HOSPITAL ENCOUNTER (EMERGENCY)
Facility: HOSPITAL | Age: 86
Discharge: HOME | End: 2021-07-31
Attending: EMERGENCY MEDICINE | Admitting: EMERGENCY MEDICINE

## 2021-07-31 ENCOUNTER — APPOINTMENT (OUTPATIENT)
Dept: GENERAL RADIOLOGY | Facility: HOSPITAL | Age: 86
End: 2021-07-31

## 2021-07-31 ENCOUNTER — APPOINTMENT (OUTPATIENT)
Dept: CT IMAGING | Facility: HOSPITAL | Age: 86
End: 2021-07-31

## 2021-07-31 VITALS
TEMPERATURE: 97.4 F | HEIGHT: 62 IN | SYSTOLIC BLOOD PRESSURE: 149 MMHG | DIASTOLIC BLOOD PRESSURE: 70 MMHG | HEART RATE: 57 BPM | OXYGEN SATURATION: 98 % | WEIGHT: 112.8 LBS | RESPIRATION RATE: 16 BRPM | BODY MASS INDEX: 20.76 KG/M2

## 2021-07-31 DIAGNOSIS — E86.0 DEHYDRATION: Primary | ICD-10-CM

## 2021-07-31 DIAGNOSIS — N28.9 ACUTE RENAL INSUFFICIENCY: ICD-10-CM

## 2021-07-31 DIAGNOSIS — M25.551 RIGHT HIP PAIN: ICD-10-CM

## 2021-07-31 DIAGNOSIS — Q62.11 HYDRONEPHROSIS WITH URETEROPELVIC JUNCTION (UPJ) OBSTRUCTION: ICD-10-CM

## 2021-07-31 LAB
ALBUMIN SERPL-MCNC: 4.2 G/DL (ref 3.5–5.2)
ALBUMIN/GLOB SERPL: 1.4 G/DL
ALP SERPL-CCNC: 91 U/L (ref 39–117)
ALT SERPL W P-5'-P-CCNC: 15 U/L (ref 1–33)
ANION GAP SERPL CALCULATED.3IONS-SCNC: 8.3 MMOL/L (ref 5–15)
AST SERPL-CCNC: 17 U/L (ref 1–32)
BASOPHILS # BLD AUTO: 0.04 10*3/MM3 (ref 0–0.2)
BASOPHILS NFR BLD AUTO: 0.5 % (ref 0–1.5)
BILIRUB SERPL-MCNC: 0.2 MG/DL (ref 0–1.2)
BILIRUB UR QL STRIP: NEGATIVE
BUN SERPL-MCNC: 53 MG/DL (ref 8–23)
BUN/CREAT SERPL: 26.5 (ref 7–25)
CALCIUM SPEC-SCNC: 9.7 MG/DL (ref 8.6–10.5)
CHLORIDE SERPL-SCNC: 105 MMOL/L (ref 98–107)
CLARITY UR: CLEAR
CO2 SERPL-SCNC: 24.7 MMOL/L (ref 22–29)
COLOR UR: YELLOW
CREAT SERPL-MCNC: 2 MG/DL (ref 0.57–1)
DEPRECATED RDW RBC AUTO: 41.4 FL (ref 37–54)
EOSINOPHIL # BLD AUTO: 0.15 10*3/MM3 (ref 0–0.4)
EOSINOPHIL NFR BLD AUTO: 1.9 % (ref 0.3–6.2)
ERYTHROCYTE [DISTWIDTH] IN BLOOD BY AUTOMATED COUNT: 12 % (ref 12.3–15.4)
GFR SERPL CREATININE-BSD FRML MDRD: 23 ML/MIN/1.73
GLOBULIN UR ELPH-MCNC: 3 GM/DL
GLUCOSE SERPL-MCNC: 115 MG/DL (ref 65–99)
GLUCOSE UR STRIP-MCNC: NEGATIVE MG/DL
HCT VFR BLD AUTO: 31 % (ref 34–46.6)
HGB BLD-MCNC: 10.2 G/DL (ref 12–15.9)
HGB UR QL STRIP.AUTO: NEGATIVE
IMM GRANULOCYTES # BLD AUTO: 0.03 10*3/MM3 (ref 0–0.05)
IMM GRANULOCYTES NFR BLD AUTO: 0.4 % (ref 0–0.5)
KETONES UR QL STRIP: NEGATIVE
LEUKOCYTE ESTERASE UR QL STRIP.AUTO: NEGATIVE
LYMPHOCYTES # BLD AUTO: 1.85 10*3/MM3 (ref 0.7–3.1)
LYMPHOCYTES NFR BLD AUTO: 23.2 % (ref 19.6–45.3)
MCH RBC QN AUTO: 30.8 PG (ref 26.6–33)
MCHC RBC AUTO-ENTMCNC: 32.9 G/DL (ref 31.5–35.7)
MCV RBC AUTO: 93.7 FL (ref 79–97)
MONOCYTES # BLD AUTO: 0.63 10*3/MM3 (ref 0.1–0.9)
MONOCYTES NFR BLD AUTO: 7.9 % (ref 5–12)
NEUTROPHILS NFR BLD AUTO: 5.28 10*3/MM3 (ref 1.7–7)
NEUTROPHILS NFR BLD AUTO: 66.1 % (ref 42.7–76)
NITRITE UR QL STRIP: NEGATIVE
NRBC BLD AUTO-RTO: 0 /100 WBC (ref 0–0.2)
PH UR STRIP.AUTO: <=5 [PH] (ref 5–8)
PLATELET # BLD AUTO: 210 10*3/MM3 (ref 140–450)
PMV BLD AUTO: 9.8 FL (ref 6–12)
POTASSIUM SERPL-SCNC: 4.5 MMOL/L (ref 3.5–5.2)
PROT SERPL-MCNC: 7.2 G/DL (ref 6–8.5)
PROT UR QL STRIP: NEGATIVE
RBC # BLD AUTO: 3.31 10*6/MM3 (ref 3.77–5.28)
SODIUM SERPL-SCNC: 138 MMOL/L (ref 136–145)
SP GR UR STRIP: 1.01 (ref 1–1.03)
UROBILINOGEN UR QL STRIP: NORMAL
WBC # BLD AUTO: 7.98 10*3/MM3 (ref 3.4–10.8)

## 2021-07-31 PROCEDURE — 85025 COMPLETE CBC W/AUTO DIFF WBC: CPT | Performed by: NURSE PRACTITIONER

## 2021-07-31 PROCEDURE — 81003 URINALYSIS AUTO W/O SCOPE: CPT | Performed by: NURSE PRACTITIONER

## 2021-07-31 PROCEDURE — 80053 COMPREHEN METABOLIC PANEL: CPT | Performed by: NURSE PRACTITIONER

## 2021-07-31 PROCEDURE — 99283 EMERGENCY DEPT VISIT LOW MDM: CPT

## 2021-07-31 PROCEDURE — 73502 X-RAY EXAM HIP UNI 2-3 VIEWS: CPT

## 2021-07-31 PROCEDURE — 72192 CT PELVIS W/O DYE: CPT

## 2021-07-31 RX ORDER — MULTIPLE VITAMINS W/ MINERALS TAB 9MG-400MCG
1 TAB ORAL DAILY
COMMUNITY

## 2021-07-31 RX ORDER — OMEPRAZOLE 40 MG/1
40 CAPSULE, DELAYED RELEASE ORAL DAILY
COMMUNITY

## 2021-07-31 RX ORDER — ACETAMINOPHEN 500 MG
500 TABLET ORAL EVERY 6 HOURS PRN
COMMUNITY

## 2021-07-31 RX ORDER — HYDROCODONE BITARTRATE AND ACETAMINOPHEN 7.5; 325 MG/1; MG/1
1 TABLET ORAL ONCE
Status: COMPLETED | OUTPATIENT
Start: 2021-07-31 | End: 2021-07-31

## 2021-07-31 RX ADMIN — HYDROCODONE BITARTRATE AND ACETAMINOPHEN 1 TABLET: 7.5; 325 TABLET ORAL at 13:00

## 2021-08-02 ENCOUNTER — TRANSCRIBE ORDERS (OUTPATIENT)
Dept: ADMINISTRATIVE | Facility: HOSPITAL | Age: 86
End: 2021-08-02

## 2021-08-02 DIAGNOSIS — M25.551 HIP PAIN, RIGHT: Primary | ICD-10-CM

## 2021-08-04 ENCOUNTER — ANESTHESIA EVENT (OUTPATIENT)
Dept: PERIOP | Facility: HOSPITAL | Age: 86
End: 2021-08-04

## 2021-08-04 ENCOUNTER — HOSPITAL ENCOUNTER (INPATIENT)
Facility: HOSPITAL | Age: 86
LOS: 2 days | Discharge: HOME HEALTH | End: 2021-08-06
Attending: ORTHOPAEDIC SURGERY | Admitting: ORTHOPAEDIC SURGERY

## 2021-08-04 ENCOUNTER — APPOINTMENT (OUTPATIENT)
Dept: GENERAL RADIOLOGY | Facility: HOSPITAL | Age: 86
End: 2021-08-04

## 2021-08-04 ENCOUNTER — ANESTHESIA (OUTPATIENT)
Dept: PERIOP | Facility: HOSPITAL | Age: 86
End: 2021-08-04

## 2021-08-04 DIAGNOSIS — M84.353A STRESS FRACTURE OF NECK OF FEMUR, INITIAL ENCOUNTER: Primary | ICD-10-CM

## 2021-08-04 LAB
QT INTERVAL: 386 MS
SARS-COV-2 RNA PNL SPEC NAA+PROBE: NOT DETECTED

## 2021-08-04 PROCEDURE — 0QH634Z INSERTION OF INTERNAL FIXATION DEVICE INTO RIGHT UPPER FEMUR, PERCUTANEOUS APPROACH: ICD-10-PCS | Performed by: ORTHOPAEDIC SURGERY

## 2021-08-04 PROCEDURE — 25010000002 DEXAMETHASONE PER 1 MG: Performed by: NURSE ANESTHETIST, CERTIFIED REGISTERED

## 2021-08-04 PROCEDURE — 93010 ELECTROCARDIOGRAM REPORT: CPT | Performed by: INTERNAL MEDICINE

## 2021-08-04 PROCEDURE — C1713 ANCHOR/SCREW BN/BN,TIS/BN: HCPCS | Performed by: ORTHOPAEDIC SURGERY

## 2021-08-04 PROCEDURE — 73502 X-RAY EXAM HIP UNI 2-3 VIEWS: CPT

## 2021-08-04 PROCEDURE — 25010000002 FENTANYL CITRATE (PF) 50 MCG/ML SOLUTION: Performed by: ANESTHESIOLOGY

## 2021-08-04 PROCEDURE — 63710000001 PROMETHAZINE PER 12.5 MG: Performed by: ORTHOPAEDIC SURGERY

## 2021-08-04 PROCEDURE — 25010000003 CEFAZOLIN IN DEXTROSE 2-4 GM/100ML-% SOLUTION: Performed by: ORTHOPAEDIC SURGERY

## 2021-08-04 PROCEDURE — 25010000002 ONDANSETRON PER 1 MG: Performed by: ANESTHESIOLOGY

## 2021-08-04 PROCEDURE — 93005 ELECTROCARDIOGRAM TRACING: CPT | Performed by: ORTHOPAEDIC SURGERY

## 2021-08-04 PROCEDURE — 25010000002 HYDROMORPHONE PER 4 MG: Performed by: ANESTHESIOLOGY

## 2021-08-04 PROCEDURE — 76000 FLUOROSCOPY <1 HR PHYS/QHP: CPT

## 2021-08-04 PROCEDURE — 25010000002 PROPOFOL 10 MG/ML EMULSION: Performed by: NURSE ANESTHETIST, CERTIFIED REGISTERED

## 2021-08-04 PROCEDURE — 87635 SARS-COV-2 COVID-19 AMP PRB: CPT | Performed by: ORTHOPAEDIC SURGERY

## 2021-08-04 PROCEDURE — 25010000002 FENTANYL CITRATE (PF) 50 MCG/ML SOLUTION: Performed by: NURSE ANESTHETIST, CERTIFIED REGISTERED

## 2021-08-04 DEVICE — SCRW CANN SD/ST THRD16MM 6.5X80MM: Type: IMPLANTABLE DEVICE | Site: LEG | Status: FUNCTIONAL

## 2021-08-04 DEVICE — SCRW CANN SD/ST THRD16MM 6.5X85MM: Type: IMPLANTABLE DEVICE | Site: LEG | Status: FUNCTIONAL

## 2021-08-04 DEVICE — SCRW CANN SD/ST THRD16MM 6.5X90MM: Type: IMPLANTABLE DEVICE | Site: LEG | Status: FUNCTIONAL

## 2021-08-04 RX ORDER — MIDAZOLAM HYDROCHLORIDE 1 MG/ML
0.5 INJECTION INTRAMUSCULAR; INTRAVENOUS
Status: DISCONTINUED | OUTPATIENT
Start: 2021-08-04 | End: 2021-08-04 | Stop reason: HOSPADM

## 2021-08-04 RX ORDER — HYDROMORPHONE HYDROCHLORIDE 1 MG/ML
0.5 INJECTION, SOLUTION INTRAMUSCULAR; INTRAVENOUS; SUBCUTANEOUS
Status: DISCONTINUED | OUTPATIENT
Start: 2021-08-04 | End: 2021-08-06 | Stop reason: HOSPADM

## 2021-08-04 RX ORDER — CEFAZOLIN SODIUM 2 G/100ML
2 INJECTION, SOLUTION INTRAVENOUS ONCE
Status: COMPLETED | OUTPATIENT
Start: 2021-08-04 | End: 2021-08-04

## 2021-08-04 RX ORDER — NALOXONE HCL 0.4 MG/ML
0.1 VIAL (ML) INJECTION
Status: DISCONTINUED | OUTPATIENT
Start: 2021-08-04 | End: 2021-08-06 | Stop reason: HOSPADM

## 2021-08-04 RX ORDER — DIPHENHYDRAMINE HCL 25 MG
25 CAPSULE ORAL
Status: DISCONTINUED | OUTPATIENT
Start: 2021-08-04 | End: 2021-08-04 | Stop reason: HOSPADM

## 2021-08-04 RX ORDER — HYDROCODONE BITARTRATE AND ACETAMINOPHEN 5; 325 MG/1; MG/1
2 TABLET ORAL EVERY 4 HOURS PRN
Status: DISCONTINUED | OUTPATIENT
Start: 2021-08-04 | End: 2021-08-06 | Stop reason: HOSPADM

## 2021-08-04 RX ORDER — FENTANYL CITRATE 50 UG/ML
INJECTION, SOLUTION INTRAMUSCULAR; INTRAVENOUS AS NEEDED
Status: DISCONTINUED | OUTPATIENT
Start: 2021-08-04 | End: 2021-08-04 | Stop reason: SURG

## 2021-08-04 RX ORDER — LIDOCAINE HYDROCHLORIDE 20 MG/ML
INJECTION, SOLUTION INFILTRATION; PERINEURAL AS NEEDED
Status: DISCONTINUED | OUTPATIENT
Start: 2021-08-04 | End: 2021-08-04 | Stop reason: SURG

## 2021-08-04 RX ORDER — PROPOFOL 10 MG/ML
VIAL (ML) INTRAVENOUS AS NEEDED
Status: DISCONTINUED | OUTPATIENT
Start: 2021-08-04 | End: 2021-08-04 | Stop reason: SURG

## 2021-08-04 RX ORDER — ROCURONIUM BROMIDE 10 MG/ML
INJECTION, SOLUTION INTRAVENOUS AS NEEDED
Status: DISCONTINUED | OUTPATIENT
Start: 2021-08-04 | End: 2021-08-04 | Stop reason: SURG

## 2021-08-04 RX ORDER — SODIUM CHLORIDE 0.9 % (FLUSH) 0.9 %
3 SYRINGE (ML) INJECTION EVERY 12 HOURS SCHEDULED
Status: DISCONTINUED | OUTPATIENT
Start: 2021-08-04 | End: 2021-08-04 | Stop reason: HOSPADM

## 2021-08-04 RX ORDER — FENTANYL CITRATE 50 UG/ML
50 INJECTION, SOLUTION INTRAMUSCULAR; INTRAVENOUS
Status: DISCONTINUED | OUTPATIENT
Start: 2021-08-04 | End: 2021-08-04 | Stop reason: HOSPADM

## 2021-08-04 RX ORDER — SODIUM CHLORIDE 0.9 % (FLUSH) 0.9 %
3 SYRINGE (ML) INJECTION EVERY 12 HOURS SCHEDULED
Status: DISCONTINUED | OUTPATIENT
Start: 2021-08-04 | End: 2021-08-06 | Stop reason: HOSPADM

## 2021-08-04 RX ORDER — FAMOTIDINE 10 MG/ML
20 INJECTION, SOLUTION INTRAVENOUS ONCE
Status: COMPLETED | OUTPATIENT
Start: 2021-08-04 | End: 2021-08-04

## 2021-08-04 RX ORDER — ACETAMINOPHEN 500 MG
500 TABLET ORAL ONCE
Status: COMPLETED | OUTPATIENT
Start: 2021-08-04 | End: 2021-08-04

## 2021-08-04 RX ORDER — HYDROCODONE BITARTRATE AND ACETAMINOPHEN 5; 325 MG/1; MG/1
1 TABLET ORAL EVERY 4 HOURS PRN
Status: DISCONTINUED | OUTPATIENT
Start: 2021-08-04 | End: 2021-08-06 | Stop reason: HOSPADM

## 2021-08-04 RX ORDER — MAGNESIUM HYDROXIDE 1200 MG/15ML
LIQUID ORAL AS NEEDED
Status: DISCONTINUED | OUTPATIENT
Start: 2021-08-04 | End: 2021-08-04 | Stop reason: HOSPADM

## 2021-08-04 RX ORDER — PROMETHAZINE HYDROCHLORIDE 25 MG/1
25 SUPPOSITORY RECTAL ONCE AS NEEDED
Status: DISCONTINUED | OUTPATIENT
Start: 2021-08-04 | End: 2021-08-04 | Stop reason: HOSPADM

## 2021-08-04 RX ORDER — DEXAMETHASONE SODIUM PHOSPHATE 10 MG/ML
INJECTION INTRAMUSCULAR; INTRAVENOUS AS NEEDED
Status: DISCONTINUED | OUTPATIENT
Start: 2021-08-04 | End: 2021-08-04 | Stop reason: SURG

## 2021-08-04 RX ORDER — SODIUM CHLORIDE 0.9 % (FLUSH) 0.9 %
3-10 SYRINGE (ML) INJECTION AS NEEDED
Status: DISCONTINUED | OUTPATIENT
Start: 2021-08-04 | End: 2021-08-06 | Stop reason: HOSPADM

## 2021-08-04 RX ORDER — PANTOPRAZOLE SODIUM 40 MG/1
40 TABLET, DELAYED RELEASE ORAL EVERY MORNING
Status: DISCONTINUED | OUTPATIENT
Start: 2021-08-05 | End: 2021-08-06 | Stop reason: HOSPADM

## 2021-08-04 RX ORDER — DIPHENHYDRAMINE HYDROCHLORIDE 50 MG/ML
12.5 INJECTION INTRAMUSCULAR; INTRAVENOUS
Status: DISCONTINUED | OUTPATIENT
Start: 2021-08-04 | End: 2021-08-04 | Stop reason: HOSPADM

## 2021-08-04 RX ORDER — HYDROMORPHONE HYDROCHLORIDE 1 MG/ML
0.5 INJECTION, SOLUTION INTRAMUSCULAR; INTRAVENOUS; SUBCUTANEOUS
Status: DISCONTINUED | OUTPATIENT
Start: 2021-08-04 | End: 2021-08-04 | Stop reason: HOSPADM

## 2021-08-04 RX ORDER — LABETALOL HYDROCHLORIDE 5 MG/ML
5 INJECTION, SOLUTION INTRAVENOUS
Status: DISCONTINUED | OUTPATIENT
Start: 2021-08-04 | End: 2021-08-04 | Stop reason: HOSPADM

## 2021-08-04 RX ORDER — HYDRALAZINE HYDROCHLORIDE 20 MG/ML
5 INJECTION INTRAMUSCULAR; INTRAVENOUS
Status: DISCONTINUED | OUTPATIENT
Start: 2021-08-04 | End: 2021-08-04 | Stop reason: HOSPADM

## 2021-08-04 RX ORDER — CYCLOSPORINE 0.5 MG/ML
1 EMULSION OPHTHALMIC 2 TIMES DAILY
Status: DISCONTINUED | OUTPATIENT
Start: 2021-08-04 | End: 2021-08-06 | Stop reason: HOSPADM

## 2021-08-04 RX ORDER — PROMETHAZINE HYDROCHLORIDE 12.5 MG/1
12.5 SUPPOSITORY RECTAL EVERY 6 HOURS PRN
Status: DISCONTINUED | OUTPATIENT
Start: 2021-08-04 | End: 2021-08-06 | Stop reason: HOSPADM

## 2021-08-04 RX ORDER — NALOXONE HCL 0.4 MG/ML
0.2 VIAL (ML) INJECTION AS NEEDED
Status: DISCONTINUED | OUTPATIENT
Start: 2021-08-04 | End: 2021-08-04 | Stop reason: HOSPADM

## 2021-08-04 RX ORDER — PROMETHAZINE HYDROCHLORIDE 25 MG/1
25 TABLET ORAL ONCE AS NEEDED
Status: DISCONTINUED | OUTPATIENT
Start: 2021-08-04 | End: 2021-08-04 | Stop reason: HOSPADM

## 2021-08-04 RX ORDER — ONDANSETRON 2 MG/ML
4 INJECTION INTRAMUSCULAR; INTRAVENOUS ONCE AS NEEDED
Status: COMPLETED | OUTPATIENT
Start: 2021-08-04 | End: 2021-08-04

## 2021-08-04 RX ORDER — SODIUM CHLORIDE 0.9 % (FLUSH) 0.9 %
3-10 SYRINGE (ML) INJECTION AS NEEDED
Status: DISCONTINUED | OUTPATIENT
Start: 2021-08-04 | End: 2021-08-04 | Stop reason: HOSPADM

## 2021-08-04 RX ORDER — SODIUM CHLORIDE, SODIUM LACTATE, POTASSIUM CHLORIDE, CALCIUM CHLORIDE 600; 310; 30; 20 MG/100ML; MG/100ML; MG/100ML; MG/100ML
9 INJECTION, SOLUTION INTRAVENOUS CONTINUOUS
Status: DISCONTINUED | OUTPATIENT
Start: 2021-08-04 | End: 2021-08-04

## 2021-08-04 RX ORDER — FLUMAZENIL 0.1 MG/ML
0.2 INJECTION INTRAVENOUS AS NEEDED
Status: DISCONTINUED | OUTPATIENT
Start: 2021-08-04 | End: 2021-08-04 | Stop reason: HOSPADM

## 2021-08-04 RX ORDER — EPHEDRINE SULFATE 50 MG/ML
5 INJECTION, SOLUTION INTRAVENOUS ONCE AS NEEDED
Status: DISCONTINUED | OUTPATIENT
Start: 2021-08-04 | End: 2021-08-04 | Stop reason: HOSPADM

## 2021-08-04 RX ORDER — CEFAZOLIN SODIUM 2 G/100ML
2 INJECTION, SOLUTION INTRAVENOUS EVERY 8 HOURS
Status: COMPLETED | OUTPATIENT
Start: 2021-08-04 | End: 2021-08-05

## 2021-08-04 RX ORDER — PROMETHAZINE HYDROCHLORIDE 12.5 MG/1
12.5 TABLET ORAL EVERY 6 HOURS PRN
Status: DISCONTINUED | OUTPATIENT
Start: 2021-08-04 | End: 2021-08-06 | Stop reason: HOSPADM

## 2021-08-04 RX ORDER — SODIUM CHLORIDE, SODIUM LACTATE, POTASSIUM CHLORIDE, CALCIUM CHLORIDE 600; 310; 30; 20 MG/100ML; MG/100ML; MG/100ML; MG/100ML
100 INJECTION, SOLUTION INTRAVENOUS CONTINUOUS
Status: DISCONTINUED | OUTPATIENT
Start: 2021-08-04 | End: 2021-08-05

## 2021-08-04 RX ORDER — OXYCODONE AND ACETAMINOPHEN 10; 325 MG/1; MG/1
1 TABLET ORAL EVERY 4 HOURS PRN
Status: DISCONTINUED | OUTPATIENT
Start: 2021-08-04 | End: 2021-08-04 | Stop reason: HOSPADM

## 2021-08-04 RX ORDER — HYDROCODONE BITARTRATE AND ACETAMINOPHEN 7.5; 325 MG/1; MG/1
1 TABLET ORAL ONCE AS NEEDED
Status: DISCONTINUED | OUTPATIENT
Start: 2021-08-04 | End: 2021-08-04 | Stop reason: HOSPADM

## 2021-08-04 RX ORDER — PSYLLIUM SEED (WITH DEXTROSE)
2 POWDER (GRAM) ORAL DAILY
Status: DISCONTINUED | OUTPATIENT
Start: 2021-08-04 | End: 2021-08-06 | Stop reason: HOSPADM

## 2021-08-04 RX ADMIN — HYDROCODONE BITARTRATE AND ACETAMINOPHEN 2 TABLET: 5; 325 TABLET ORAL at 21:53

## 2021-08-04 RX ADMIN — CEFAZOLIN SODIUM 2 G: 2 INJECTION, SOLUTION INTRAVENOUS at 21:53

## 2021-08-04 RX ADMIN — PROPOFOL 50 MG: 10 INJECTION, EMULSION INTRAVENOUS at 14:02

## 2021-08-04 RX ADMIN — Medication 2 WAFER: at 18:29

## 2021-08-04 RX ADMIN — PROPOFOL 100 MG: 10 INJECTION, EMULSION INTRAVENOUS at 14:00

## 2021-08-04 RX ADMIN — LIDOCAINE HYDROCHLORIDE 60 MG: 20 INJECTION, SOLUTION INFILTRATION; PERINEURAL at 14:00

## 2021-08-04 RX ADMIN — METOPROLOL TARTRATE 12.5 MG: 25 TABLET, FILM COATED ORAL at 21:54

## 2021-08-04 RX ADMIN — FENTANYL CITRATE 50 MCG: 50 INJECTION INTRAMUSCULAR; INTRAVENOUS at 14:47

## 2021-08-04 RX ADMIN — PROMETHAZINE HYDROCHLORIDE 12.5 MG: 12.5 TABLET ORAL at 20:05

## 2021-08-04 RX ADMIN — FENTANYL CITRATE 50 MCG: 50 INJECTION, SOLUTION INTRAMUSCULAR; INTRAVENOUS at 15:31

## 2021-08-04 RX ADMIN — ROCURONIUM BROMIDE 20 MG: 50 INJECTION INTRAVENOUS at 14:07

## 2021-08-04 RX ADMIN — SODIUM CHLORIDE, PRESERVATIVE FREE 3 ML: 5 INJECTION INTRAVENOUS at 21:53

## 2021-08-04 RX ADMIN — FENTANYL CITRATE 50 MCG: 50 INJECTION, SOLUTION INTRAMUSCULAR; INTRAVENOUS at 15:18

## 2021-08-04 RX ADMIN — HYDROMORPHONE HYDROCHLORIDE 0.5 MG: 1 INJECTION, SOLUTION INTRAMUSCULAR; INTRAVENOUS; SUBCUTANEOUS at 15:21

## 2021-08-04 RX ADMIN — DEXAMETHASONE SODIUM PHOSPHATE 8 MG: 10 INJECTION INTRAMUSCULAR; INTRAVENOUS at 14:17

## 2021-08-04 RX ADMIN — CYCLOSPORINE 1 DROP: 0.5 EMULSION OPHTHALMIC at 21:53

## 2021-08-04 RX ADMIN — CEFAZOLIN SODIUM 2 G: 2 INJECTION, SOLUTION INTRAVENOUS at 13:46

## 2021-08-04 RX ADMIN — ONDANSETRON 4 MG: 2 INJECTION INTRAMUSCULAR; INTRAVENOUS at 15:20

## 2021-08-04 RX ADMIN — APIXABAN 2.5 MG: 2.5 TABLET, FILM COATED ORAL at 18:29

## 2021-08-04 RX ADMIN — HYDROCODONE BITARTRATE AND ACETAMINOPHEN 1 TABLET: 5; 325 TABLET ORAL at 17:21

## 2021-08-04 RX ADMIN — FAMOTIDINE 20 MG: 10 INJECTION INTRAVENOUS at 12:08

## 2021-08-04 RX ADMIN — FENTANYL CITRATE 25 MCG: 50 INJECTION INTRAMUSCULAR; INTRAVENOUS at 14:12

## 2021-08-04 RX ADMIN — FENTANYL CITRATE 25 MCG: 50 INJECTION INTRAMUSCULAR; INTRAVENOUS at 14:00

## 2021-08-04 RX ADMIN — SODIUM CHLORIDE, POTASSIUM CHLORIDE, SODIUM LACTATE AND CALCIUM CHLORIDE 9 ML/HR: 600; 310; 30; 20 INJECTION, SOLUTION INTRAVENOUS at 12:04

## 2021-08-04 RX ADMIN — ACETAMINOPHEN 500 MG: 500 TABLET, FILM COATED ORAL at 12:08

## 2021-08-04 NOTE — ANESTHESIA PROCEDURE NOTES
Airway  Urgency: elective    Date/Time: 8/4/2021 2:03 PM  Airway not difficult    General Information and Staff    Patient location during procedure: OR  Anesthesiologist: Ke Urias MD  CRNA: Tran Tam CRNA    Indications and Patient Condition  Indications for airway management: airway protection    Preoxygenated: yes  MILS maintained throughout  Mask difficulty assessment: 1 - vent by mask    Final Airway Details  Final airway type: supraglottic airway      Successful airway: classic  Size 3    Number of attempts at approach: 1  Assessment: lips, teeth, and gum same as pre-op    Additional Comments  Pt preoxygenated, sivi, LMA placed with adequate seal and TV

## 2021-08-04 NOTE — INTERVAL H&P NOTE
H&P reviewed. The patient was examined and there are no changes to the H&P.    Patient with right femoral neck stress fracture is diagnosed on MRI performed yesterday as an outpatient.  Will proceed with percutaneous screw fixation of the right femoral neck.  Patient understands the risks including screw fixation failure, posttraumatic arthrosis infection DVT as well as risk of recurrent falls.  We will plan on admission postoperatively with urology consultation for diagnosis of hydronephrosis.  Likely plan for discharge to SNF for rehab.
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
## 2021-08-04 NOTE — CONSULTS
CONSULT NOTE    INTERNAL MEDICINE   [facility]       Patient Identification:  Name: eBrnie Thomas  Age: 89 y.o.  Sex: female  :  1932  MRN: [id number]             Date of Consultation:  21          Primary Care Physician: Bella George APRN                               Requesting Physician: dr mancilla  Reason for Consultation:medical management    Chief Complaint:  89 year old female who was admitted by orthopedics due to a right femoral neck stress fracture which was discovered on an outpatient mri yesterday; she was taken to the OR earlier today for orif; we are asked to see her for postop medical management; she has a history of atrial fibrillation. ckd and anemia; she is doing well postop;     History of Present Illness:   As above      Past Medical History:  Past Medical History:   Diagnosis Date   • A-fib (CMS/HCC)    • A-fib (CMS/HCC)    • Hiatal hernia    • History of transfusion    • Osteoporosis      Past Surgical History:  Past Surgical History:   Procedure Laterality Date   • CATARACT EXTRACTION, BILATERAL     • HIP ENDOPROSTHESIS Left 2020    Procedure: LEFT HIP BI-POLAR;  Surgeon: Desmond Mcdonnell MD;  Location: [facility];  Service: Orthopedics   • HYSTERECTOMY        Home Meds:  Medications Prior to Admission   Medication Sig Dispense Refill Last Dose   • acetaminophen (TYLENOL) 500 MG tablet Take 500 mg by mouth Every 6 (Six) Hours As Needed for Mild Pain .   2021 at 0300   • apixaban (ELIQUIS) 2.5 MG tablet tablet Take 2.5 mg by mouth 2 (Two) Times a Day.   8/3/2021 at 0900   • calcium citrate-vitamin d (CITRACAL) 200-250 MG-UNIT tablet tablet Take  by mouth Daily.   8/3/2021 at 0830   • cycloSPORINE (RESTASIS) 0.05 % ophthalmic emulsion Administer 1 drop to both eyes 2 (Two) Times a Day.   2021 at 0830   • metoprolol tartrate (LOPRESSOR) 25 MG tablet Take 12.5 mg by mouth 2 (Two) Times a Day.   2021 at 0830   • multivitamin with  minerals (MULTIVITAMIN ADULT PO) Take 1 tablet by mouth Daily. [place] complete for iron   8/3/2021 at 0830   • omeprazole (priLOSEC) 40 MG capsule Take 40 mg by mouth Daily.   8/4/2021 at 0830   • psyllium (METAMUCIL) 58.6 % packet Take 1 packet by mouth Daily.   8/3/2021 at 0800   • HYDROcodone-acetaminophen (NORCO) 5-325 MG per tablet Take 1 tablet by mouth Every 4 (Four) Hours As Needed.      • Polyethylene Glycol 3350 (MIRALAX PO) Take  by mouth As Needed.   More than a month at Unknown time     Current Meds:     Current Facility-Administered Medications:   •  apixaban (ELIQUIS) tablet 2.5 mg, 2.5 mg, Oral, Q12H, Mark Valadez MD, 2.5 mg at 08/04/21 1829  •  ceFAZolin in dextrose (ANCEF) IVPB solution 2 g, 2 g, Intravenous, Q8H, Mark Valadez MD  •  cycloSPORINE (RESTASIS) 0.05 % ophthalmic emulsion 1 drop, 1 drop, Both Eyes, BID, Mark Valadez MD  •  HYDROcodone-acetaminophen (NORCO) 5-325 MG per tablet 1 tablet, 1 tablet, Oral, Q4H PRN, Mark Valadez MD, 1 tablet at 08/04/21 1721  •  HYDROcodone-acetaminophen (NORCO) 5-325 MG per tablet 2 tablet, 2 tablet, Oral, Q4H PRN, Mark Valadez MD  •  HYDROmorphone (DILAUDID) injection 0.5 mg, 0.5 mg, Intravenous, Q2H PRN **AND** naloxone (NARCAN) injection 0.1 mg, 0.1 mg, Intravenous, Q5 Min PRN, Mark Valadez MD  •  lactated ringers infusion, 100 mL/hr, Intravenous, Continuous, Mark Valadez MD, Last Rate: 100 mL/hr at 08/04/21 1823, 100 mL/hr at 08/04/21 1823  •  Metamucil wafer 2 wafer, 2 wafer, Oral, Daily, Mark Valadez MD, 2 wafer at 08/04/21 1829  •  metoprolol tartrate (LOPRESSOR) tablet 12.5 mg, 12.5 mg, Oral, BID, Mark Valadez MD  •  [START ON 8/5/2021] pantoprazole (PROTONIX) EC tablet 40 mg, 40 mg, Oral, QAM, Mark Valadez MD  •  promethazine (PHENERGAN) tablet 12.5 mg, 12.5 mg, Oral, Q6H PRN **OR** promethazine (PHENERGAN) suppository 12.5 mg, 12.5 mg, Rectal, Q6H PRN, Mark Valadez MD  •  sodium chloride 0.9 % flush 3 mL, 3 mL,  "Intravenous, Q12H, Mark Valadez MD  •  sodium chloride 0.9 % flush 3-10 mL, 3-10 mL, Intravenous, PRN, Mark Valadez MD  Allergies:  No Known Allergies  Social History:   Social History     Socioeconomic History   • Marital status:      Spouse name: Not on file   • Number of children: Not on file   • Years of education: Not on file   • Highest education level: Not on file   Tobacco Use   • Smoking status: Current Every Day Smoker     Types: Cigarettes   • Smokeless tobacco: Never Used   • Tobacco comment: \"About 4 or 5 a day\"   Vaping Use   • Vaping Use: Never used   Substance and Sexual Activity   • Alcohol use: Yes     Alcohol/week: 7.0 standard drinks     Types: 7 Glasses of wine per week   • Drug use: Never   • Sexual activity: Defer     Family History:  History reviewed. No pertinent family history.       Review of Systems  See history of present illness and past medical history.  Patient denies headache, dizziness, syncope, falls, trauma, change in vision, change in hearing, change in taste, changes in weight, changes in appetite, focal weakness, numbness, or paresthesia.  Patient denies chest pain, palpitations, dyspnea, orthopnea, PND, cough, sinus pressure, rhinorrhea, epistaxis, hemoptysis, nausea, vomiting,hematemesis, diarrhea, constipation or hematchezia.  Denies cold or heat intolerance, polydipsia, polyuria, polyphagia. Denies hematuria, pyuria, dysuria, hesitancy, frequency or urgency. Denies consumption of raw and under cooked meats foods or change in water source.  Denies fever, chills, sweats, night sweats.  Denies missing any routine medications. Remainder of ROS is negative.      Vitals:   /76 (BP Location: Right arm, Patient Position: Lying)   Pulse 69   Temp 97.2 °F (36.2 °C) (Oral)   Resp 16   Ht 157.5 cm (62\")   Wt 47.4 kg (104 lb 6.4 oz)   SpO2 100%   BMI 19.10 kg/m²   I/O:     Intake/Output Summary (Last 24 hours) at 8/4/2021 1935  Last data filed at 8/4/2021 " 1551  Gross per 24 hour   Intake 220 ml   Output 250 ml   Net -30 ml     Exam:  General Appearance:    Alert, cooperative, no distress, appears stated age; thin, frail; chronically ill appearing   Head:    Normocephalic, without obvious abnormality, atraumatic   Eyes:    PERRL, conjunctivae/corneas clear, EOM's intact, both eyes   Ears:    Normal external ear canals, both ears   Nose:   Nares normal, septum midline, mucosa normal, no drainage    or sinus tenderness   Throat:   Lips, tongue, gums normal; oral mucosa pink and moist   Neck:   Supple, symmetrical, trachea midline, no adenopathy;     thyroid:  no enlargement/tenderness/nodules; no carotid    bruit or JVD   Back:     Symmetric, no curvature, ROM normal, no CVA tenderness   Lungs:     Decreased breath sounds bilaterally, respirations unlabored   Chest Wall:    No tenderness or deformity    Heart:    Regular rate and rhythm, S1 and S2 normal, no murmur, rub   or gallop   Abdomen:     Soft, nontender, bowel sounds active all four quadrants,     no masses, no hepatomegaly, no splenomegaly   Extremities:   Extremities normal, atraumatic, no cyanosis or edema   Pulses:   Pulses palpable in all extremities; symmetric all extremities   Skin:   Skin color normal, Skin is warm and dry,  no rashes or palpable lesions   Neurologic:   CNII-XII intact, motor strength grossly intact, sensation grossly intact to light touch, no focal deficits noted       Data Review:  Labs in chart were reviewed.  No results found for: WBC, HGB, HCT, PLT  No results found for: NA, K, CL, CO2, BUN, CREATININE, GLUCOSE  No results found for: CALCIUM, MG, PHOS  No results found for: AST, ALT, ALKPHOS            Imaging Results (Last 7 Days)     Procedure Component Value Units Date/Time    XR Hip With or Without Pelvis 2 - 3 View Right [[id number]] Collected: 08/04/21 1541     Updated: 08/04/21 1628    Narrative:      TWO-VIEW PORTABLE RIGHT HIP IN OR     HISTORY: Internal fixation of  fracture.     Imaging in the operating room was performed at the time of internal  fixation of a nondisplaced femoral neck fracture with 3 pins and the  alignment appears satisfactory. Three images were obtained and the  fluoroscopy time measures 43 seconds.     This report was finalized on 8/4/2021 4:25 PM by Dr. David Ware M.D.       FL C Arm During Surgery [[id number]] Resulted: 08/04/21 1509     Updated: 08/04/21 1509    Narrative:      This procedure was auto-finalized with no dictation required.    SCANNED - IMAGING [[id number]] Resulted: 08/04/21     Updated: 08/03/21 1751    SCANNED - IMAGING [[id number]] Resulted: 08/04/21     Updated: 08/03/21 1751        Past Medical History:   Diagnosis Date   • A-fib (CMS/HCC)    • A-fib (CMS/HCC)    • Hiatal hernia    • History of transfusion    • Osteoporosis        Assessment:  Active Hospital Problems    Diagnosis  POA   • Femoral neck stress fracture [M84.353A]  Yes      Resolved Hospital Problems   No resolved problems to display.   atrial fibrillation  ckd4  Anemia      Plan:  Monitor hgb and creatinine  Pain is well controlled  Mobilize as recommended per ortho  On apixaban  Blood pressure is controlled  Thanks for involving us in her are  Will follow with you    Xuan Jarrett MD   8/4/2021  19:35 EDT

## 2021-08-04 NOTE — PLAN OF CARE
Goal Outcome Evaluation:  Plan of Care Reviewed With: patient        Progress: improving  Outcome Summary: VSS, NVI, Dressing c/d/i, good pain control with PO, voided in PREOP, plan to dc snf in a few days

## 2021-08-04 NOTE — ANESTHESIA POSTPROCEDURE EVALUATION
"Patient: Bernie Thomas    Procedure Summary     Date: 08/04/21 Room / Location: [facility] OR [address] [place] [address] MAIN OR    Anesthesia Start: 1350 Anesthesia Stop: 1458    Procedure: PERCUTANEOUS PINNING OF RIGHT HIP (Right Thigh) Diagnosis:     Surgeons: Mark Valadez MD Provider: Ke Urias MD    Anesthesia Type: general ASA Status: 3          Anesthesia Type: general    Vitals  Vitals Value Taken Time   /71 08/04/21 1545   Temp 36.6 °C (97.9 °F) 08/04/21 1545   Pulse 66 08/04/21 1558   Resp 16 08/04/21 1545   SpO2 100 % 08/04/21 1558   Vitals shown include unvalidated device data.        Post Anesthesia Care and Evaluation    Patient location during evaluation: bedside  Patient participation: complete - patient participated  Level of consciousness: awake and alert  Pain score: 0  Pain management: adequate  Airway patency: patent  Anesthetic complications: No anesthetic complications  PONV Status: none  Cardiovascular status: acceptable and hemodynamically stable  Respiratory status: acceptable and spontaneous ventilation  Hydration status: acceptable    Comments: /80   Pulse 94   Temp 36.6 °C (97.9 °F) (Oral)   Resp 16   Ht 157.5 cm (62\")   Wt 47.4 kg (104 lb 6.4 oz)   SpO2 100%   BMI 19.10 kg/m²         "

## 2021-08-04 NOTE — ANESTHESIA PROCEDURE NOTES
Airway  Urgency: elective    Date/Time: 8/4/2021 2:03 PM  Airway not difficult    General Information and Staff    Patient location during procedure: OR  Anesthesiologist: Ke Urias MD  CRNA: Tran Tam CRNA    Indications and Patient Condition  Indications for airway management: airway protection    Preoxygenated: yes  MILS maintained throughout  Mask difficulty assessment: 1 - vent by mask    Final Airway Details  Final airway type: endotracheal airway      Successful airway: ETT  Cuffed: yes   Successful intubation technique: direct laryngoscopy  Endotracheal tube insertion site: oral  Blade: Skinner  Blade size: 2  ETT size (mm): 6.0  Cormack-Lehane Classification: grade I - full view of glottis  Placement verified by: chest auscultation and capnometry   Cuff volume (mL): 8  Measured from: lips  ETT/EBT  to lips (cm): 18  Number of attempts at approach: 1  Assessment: lips, teeth, and gum same as pre-op and atraumatic intubation    Additional Comments  LMA removed d/t pt movement and request for paralytic by Dr. Valadez.  Bag mask vent, ATETI, dentition as before

## 2021-08-04 NOTE — CONSULTS
FIRST UROLOGY CONSULT      Patient Identification:  NAME:  Bernie Thomas  Age:  89 y.o.   Sex:  female   :  1932   MRN:  [id number]       Chief complaint: incidental chronic hydronephrosis    History of present illness:  89 yo lady with recent surgery for femoral fracture.  She was just seen in the office yesterday. Imaging reviewed and examined.  She has a chronic upj obstruction that does not need further evaluation      Past medical history:  Past Medical History:   Diagnosis Date   • A-fib (CMS/HCC)    • A-fib (CMS/HCC)    • Hiatal hernia    • History of transfusion    • Osteoporosis        Past surgical history:  Past Surgical History:   Procedure Laterality Date   • CATARACT EXTRACTION, BILATERAL     • HIP ENDOPROSTHESIS Left 2020    Procedure: LEFT HIP BI-POLAR;  Surgeon: Desmond Mcdonnell MD;  Location: [facility];  Service: Orthopedics   • HYSTERECTOMY         Allergies:  Patient has no known allergies.    Home medications:  Medications Prior to Admission   Medication Sig Dispense Refill Last Dose   • acetaminophen (TYLENOL) 500 MG tablet Take 500 mg by mouth Every 6 (Six) Hours As Needed for Mild Pain .   2021 at 0300   • apixaban (ELIQUIS) 2.5 MG tablet tablet Take 2.5 mg by mouth 2 (Two) Times a Day.   8/3/2021 at 0900   • calcium citrate-vitamin d (CITRACAL) 200-250 MG-UNIT tablet tablet Take  by mouth Daily.   8/3/2021 at 0830   • cycloSPORINE (RESTASIS) 0.05 % ophthalmic emulsion Administer 1 drop to both eyes 2 (Two) Times a Day.   2021 at 0830   • metoprolol tartrate (LOPRESSOR) 25 MG tablet Take 12.5 mg by mouth 2 (Two) Times a Day.   2021 at 0830   • multivitamin with minerals (MULTIVITAMIN ADULT PO) Take 1 tablet by mouth Daily. [place] complete for iron   8/3/2021 at 0830   • omeprazole (priLOSEC) 40 MG capsule Take 40 mg by mouth Daily.   2021 at 0830   • psyllium (METAMUCIL) 58.6 % packet Take 1 packet by mouth Daily.   8/3/2021 at 0800   •  "HYDROcodone-acetaminophen (NORCO) 5-325 MG per tablet Take 1 tablet by mouth Every 4 (Four) Hours As Needed.      • Polyethylene Glycol 3350 (MIRALAX PO) Take  by mouth As Needed.   More than a month at Unknown time       Hospital medications:  apixaban, 2.5 mg, Oral, Q12H  ceFAZolin, 2 g, Intravenous, Q8H  cycloSPORINE, 1 drop, Both Eyes, BID  Metamucil, 2 wafer, Oral, Daily  metoprolol tartrate, 12.5 mg, Oral, BID  [START ON 2021] pantoprazole, 40 mg, Oral, QAM  sodium chloride, 3 mL, Intravenous, Q12H      lactated ringers, 100 mL/hr, Last Rate: 100 mL/hr (21)      HYDROcodone-acetaminophen  •  HYDROcodone-acetaminophen  •  HYDROmorphone **AND** naloxone  •  promethazine **OR** promethazine  •  sodium chloride    Family history:  History reviewed. No pertinent family history.    Social history:  Social History     Tobacco Use   • Smoking status: Current Every Day Smoker     Types: Cigarettes   • Smokeless tobacco: Never Used   • Tobacco comment: \"About 4 or 5 a day\"   Vaping Use   • Vaping Use: Never used   Substance Use Topics   • Alcohol use: Yes     Alcohol/week: 7.0 standard drinks     Types: 7 Glasses of wine per week   • Drug use: Never       Review of systems:    Negative 12-system ROS except for the following:        Objective:  TMax 24 hours:   Temp (24hrs), Av.8 °F (36.6 °C), Min:97.2 °F (36.2 °C), Max:98.4 °F (36.9 °C)      Vitals Ranges:   Temp:  [97.2 °F (36.2 °C)-98.4 °F (36.9 °C)] 97.2 °F (36.2 °C)  Heart Rate:  [68-94] 69  Resp:  [16-18] 16  BP: (123-156)/(59-90) 137/76    Intake/Output Last 3 shifts:  No intake/output data recorded.     Physical Exam:       General Appearance:    Alert, cooperative, in no acute distress   Head:    Normocephalic, without obvious abnormality, atraumatic   Eyes:          PERRL, conjunctivae and corneas clear   Ears:    Normal external inspection   Throat:   No oral lesions, oral mucosa moist   Neck:   Supple, no LAD, trachea midline   Back:     No " CVA tenderness   Lungs:     Respirations unlabored, symmetric excursion    Heart:    RRR, intact peripheral pulses   Abdomen:        :       Extremities:   No edema, no deformity   Skin:   No bleeding, bruising or rashes   Neuro/Psych:   Orientation intact, mood/affect pleasant, no focal findings       Results review:   I reviewed the patient's new clinical results.    Data review:  Lab Results (last 24 hours)     Procedure Component Value Units Date/Time    COVID PRE-OP / PRE-PROCEDURE SCREENING ORDER (NO ISOLATION) - Swab, Nasopharynx [[id number]]  (Normal) Collected: 08/04/21 1133    Specimen: Swab from Nasopharynx Updated: 08/04/21 1243    Narrative:      The following orders were created for panel order COVID PRE-OP / PRE-PROCEDURE SCREENING ORDER (NO ISOLATION) - Swab, Nasopharynx.  Procedure                               Abnormality         Status                     ---------                               -----------         ------                     COVID-19,[facility]-HOUSE...[[id number]]  Normal              Final result                 Please view results for these tests on the individual orders.    COVID-19,[facility]HOU[facility] CEPHEID/ANNABELLA NP SWAB IN TRANSPORT MEDIA 8-12 HR TAT - Swab, Nasopharynx [[id number]]  (Normal) Collected: 08/04/21 1133    Specimen: Swab from Nasopharynx Updated: 08/04/21 1243     COVID19 Not Detected    Narrative:      Fact sheet for providers: https://www.fda.gov/media/524632/download    Fact sheet for patients: https://www.fda.gov/media/467757/download    Test performed by PCR.           Imaging:  Imaging Results (Last 24 Hours)     Procedure Component Value Units Date/Time    XR Hip With or Without Pelvis 2 - 3 View Right [[id number]] Collected: 08/04/21 1541     Updated: 08/04/21 1628    Narrative:      TWO-VIEW PORTABLE RIGHT HIP IN OR     HISTORY: Internal fixation of fracture.     Imaging in the operating room was performed at the time of internal  fixation of a nondisplaced  femoral neck fracture with 3 pins and the  alignment appears satisfactory. Three images were obtained and the  fluoroscopy time measures 43 seconds.     This report was finalized on 8/4/2021 4:25 PM by Dr. David Ware M.D.       FL C Arm During Surgery [[id number]] Resulted: 08/04/21 1509     Updated: 08/04/21 1509    Narrative:      This procedure was auto-finalized with no dictation required.             Assessment:       Femoral neck stress fracture    Chronic upjo. No need for intervention    Plan:     Prn followup. Please call us if needed. Thank you    Philip Ortiz Jr., MD  08/04/21  18:33 EDT

## 2021-08-04 NOTE — ANESTHESIA PREPROCEDURE EVALUATION
Anesthesia Evaluation     no history of anesthetic complications:  NPO Solid Status: > 8 hours  NPO Liquid Status: > 2 hours           Airway   Mallampati: II  no difficulty expected  Dental    (+) upper dentures    Pulmonary - normal exam   (+) a smoker (cessation advised) Current Smoked day of surgery,   (-) COPD, asthma, sleep apnea    PE comment: nonlabored  Cardiovascular - normal exam    Rhythm: regular  Rate: normal    (+) dysrhythmias Atrial Fib,   (-) hypertension, valvular problems/murmurs, past MI, CAD, angina      Neuro/Psych- negative ROS  (-) seizures, TIA, CVA  GI/Hepatic/Renal/Endo    (+)  hiatal hernia,    (-) GERD, liver disease, no renal disease, diabetes, no thyroid disorder    Musculoskeletal (-) negative ROS        ROS comment: R hip stress fracture of femoral neck  Abdominal    Substance History      OB/GYN          Other                      Anesthesia Plan    ASA 3     general     intravenous induction     Anesthetic plan, all risks, benefits, and alternatives have been provided, discussed and informed consent has been obtained with: patient.

## 2021-08-05 LAB
ANION GAP SERPL CALCULATED.3IONS-SCNC: 11.1 MMOL/L (ref 5–15)
ANION GAP SERPL CALCULATED.3IONS-SCNC: 7.4 MMOL/L (ref 5–15)
BUN SERPL-MCNC: 34 MG/DL (ref 8–23)
BUN SERPL-MCNC: 35 MG/DL (ref 8–23)
BUN/CREAT SERPL: 18.3 (ref 7–25)
BUN/CREAT SERPL: 20.4 (ref 7–25)
CALCIUM SPEC-SCNC: 8.8 MG/DL (ref 8.6–10.5)
CALCIUM SPEC-SCNC: 8.9 MG/DL (ref 8.6–10.5)
CHLORIDE SERPL-SCNC: 102 MMOL/L (ref 98–107)
CHLORIDE SERPL-SCNC: 98 MMOL/L (ref 98–107)
CO2 SERPL-SCNC: 23.6 MMOL/L (ref 22–29)
CO2 SERPL-SCNC: 24.9 MMOL/L (ref 22–29)
CREAT SERPL-MCNC: 1.67 MG/DL (ref 0.57–1)
CREAT SERPL-MCNC: 1.91 MG/DL (ref 0.57–1)
GFR SERPL CREATININE-BSD FRML MDRD: 25 ML/MIN/1.73
GFR SERPL CREATININE-BSD FRML MDRD: 29 ML/MIN/1.73
GLUCOSE SERPL-MCNC: 107 MG/DL (ref 65–99)
GLUCOSE SERPL-MCNC: 145 MG/DL (ref 65–99)
HCT VFR BLD AUTO: 27.9 % (ref 34–46.6)
HGB BLD-MCNC: 9 G/DL (ref 12–15.9)
POTASSIUM SERPL-SCNC: 5 MMOL/L (ref 3.5–5.2)
POTASSIUM SERPL-SCNC: 5.6 MMOL/L (ref 3.5–5.2)
SODIUM SERPL-SCNC: 133 MMOL/L (ref 136–145)
SODIUM SERPL-SCNC: 134 MMOL/L (ref 136–145)

## 2021-08-05 PROCEDURE — 25010000003 CEFAZOLIN IN DEXTROSE 2-4 GM/100ML-% SOLUTION: Performed by: ORTHOPAEDIC SURGERY

## 2021-08-05 PROCEDURE — 80048 BASIC METABOLIC PNL TOTAL CA: CPT | Performed by: ORTHOPAEDIC SURGERY

## 2021-08-05 PROCEDURE — 97162 PT EVAL MOD COMPLEX 30 MIN: CPT

## 2021-08-05 PROCEDURE — 97110 THERAPEUTIC EXERCISES: CPT

## 2021-08-05 PROCEDURE — 85018 HEMOGLOBIN: CPT | Performed by: ORTHOPAEDIC SURGERY

## 2021-08-05 PROCEDURE — 85014 HEMATOCRIT: CPT | Performed by: ORTHOPAEDIC SURGERY

## 2021-08-05 RX ORDER — SODIUM CHLORIDE 9 MG/ML
75 INJECTION, SOLUTION INTRAVENOUS CONTINUOUS
Status: DISCONTINUED | OUTPATIENT
Start: 2021-08-05 | End: 2021-08-06 | Stop reason: HOSPADM

## 2021-08-05 RX ORDER — FLUTICASONE PROPIONATE 50 MCG
2 SPRAY, SUSPENSION (ML) NASAL DAILY
Status: DISCONTINUED | OUTPATIENT
Start: 2021-08-05 | End: 2021-08-06 | Stop reason: HOSPADM

## 2021-08-05 RX ORDER — HYDROCODONE BITARTRATE AND ACETAMINOPHEN 5; 325 MG/1; MG/1
2 TABLET ORAL EVERY 6 HOURS PRN
Qty: 30 TABLET | Refills: 0 | Status: SHIPPED | OUTPATIENT
Start: 2021-08-05 | End: 2021-08-14

## 2021-08-05 RX ADMIN — HYDROCODONE BITARTRATE AND ACETAMINOPHEN 1 TABLET: 5; 325 TABLET ORAL at 05:17

## 2021-08-05 RX ADMIN — Medication 2 WAFER: at 08:09

## 2021-08-05 RX ADMIN — SODIUM CHLORIDE, PRESERVATIVE FREE 3 ML: 5 INJECTION INTRAVENOUS at 08:09

## 2021-08-05 RX ADMIN — HYDROCODONE BITARTRATE AND ACETAMINOPHEN 1 TABLET: 5; 325 TABLET ORAL at 09:57

## 2021-08-05 RX ADMIN — METOPROLOL TARTRATE 12.5 MG: 25 TABLET, FILM COATED ORAL at 08:09

## 2021-08-05 RX ADMIN — APIXABAN 2.5 MG: 2.5 TABLET, FILM COATED ORAL at 20:46

## 2021-08-05 RX ADMIN — HYDROCODONE BITARTRATE AND ACETAMINOPHEN 1 TABLET: 5; 325 TABLET ORAL at 22:19

## 2021-08-05 RX ADMIN — APIXABAN 2.5 MG: 2.5 TABLET, FILM COATED ORAL at 08:09

## 2021-08-05 RX ADMIN — SODIUM CHLORIDE, POTASSIUM CHLORIDE, SODIUM LACTATE AND CALCIUM CHLORIDE 100 ML/HR: 600; 310; 30; 20 INJECTION, SOLUTION INTRAVENOUS at 01:58

## 2021-08-05 RX ADMIN — CYCLOSPORINE 1 DROP: 0.5 EMULSION OPHTHALMIC at 20:46

## 2021-08-05 RX ADMIN — SODIUM CHLORIDE 75 ML/HR: 9 INJECTION, SOLUTION INTRAVENOUS at 15:44

## 2021-08-05 RX ADMIN — PANTOPRAZOLE SODIUM 40 MG: 40 TABLET, DELAYED RELEASE ORAL at 06:23

## 2021-08-05 RX ADMIN — METOPROLOL TARTRATE 12.5 MG: 25 TABLET, FILM COATED ORAL at 20:46

## 2021-08-05 RX ADMIN — CEFAZOLIN SODIUM 2 G: 2 INJECTION, SOLUTION INTRAVENOUS at 05:18

## 2021-08-05 RX ADMIN — CYCLOSPORINE 1 DROP: 0.5 EMULSION OPHTHALMIC at 08:09

## 2021-08-05 RX ADMIN — FLUTICASONE PROPIONATE 2 SPRAY: 50 SPRAY, METERED NASAL at 10:28

## 2021-08-05 RX ADMIN — HYDROCODONE BITARTRATE AND ACETAMINOPHEN 1 TABLET: 5; 325 TABLET ORAL at 20:46

## 2021-08-05 RX ADMIN — SODIUM CHLORIDE, PRESERVATIVE FREE 3 ML: 5 INJECTION INTRAVENOUS at 20:47

## 2021-08-05 NOTE — DISCHARGE INSTRUCTIONS
Mark Valadez MD  Hip pinning  Discharge Instructions    • INCISION CARE  o Wash your hands prior to dressing changes  o The the postoperative dressing should remain in place for 5 to 7 days postoperatively.  You may remove the dressing at that point and replaced with a dry dressing.  As long as the incision is dry and no longer draining, you may shower and get the incision wet.  Do not submerge the incision in a bathtub swimming pool or hot tub until told is okay to do so.  o No creams or ointments to the incision until 4 weeks post op.   o Do not touch or pick at the incision  o Once your dressing is removed check incision every day and notify surgeon immediately if any of the following signs or symptoms are seen:  - Increase in redness  - Increase in swelling around the incision and of the entire extremity  - Increase in pain  - NEW drainage or oozing from the incision  - Pulling apart of the edges of the incision  - Increase in overall body temperature (greater than 100.4 degrees)  o Your surgeon will instruct you regarding suture or staple removal. In general, this happens at the 2-week post op appointment.    • ACTIVITIES  o Exercises:.  - Elevate the affected leg most of the day during the first week post operatively.   - Use cold packs for 20-30 minutes approximately 5 times per day.  - You should perform ankle pump exercises several times daily to help minimize the risk of developing a blood clot.  - Activities of Daily Living:   - Showering may begin immediately, however keep the dressing in place for the first week postoperatively.  - No tub baths, hot tubs, or swimming pools for 4 weeks.  - Dr Valadez will advise you when you may begin to get the incision wet for showering      • Restrictions  o Weight: You may place weight on the operative extremity as tolerated, ambulate with a walker  o Driving: Many patients have questions about when it is safe to return to driving. The answer is that this is  extremely variable. It depends on the extent of the surgery, as well as how quickly you heal. Until you can safely navigate the steering wheel, and are off of all narcotics, driving is not permitted. You will also not be allowed to drive until you are out of you splint/brace and allowed to bear full weight on the injured leg.  This often takes several months.  Your surgeon cannot “clear” you to return to driving, only you can make the decision when you feel it is safe.     • Medications  o Anticoagulants: After upper extremity surgery most patients do not require an anticoagulant unless you have another injury that will be keeping you from mobilizing. Lower extremity surgery typically does require use of an anticoagulation medicine.   - IF YOU HAD LOWER EXTREMITY SURGERY AND ARE NOT DISCHARGED HOME WITH ANY ANTICOAGULANT MEDICINE YOU SHOULD TAKE ASPIRIN 81mg twice daily FOR 30 DAYS POSTOPERATIVELY.  - If you are discharged home with an anticoagulant such as Aspirin, Xarelto, Eliquis, Coumadin, or Lovenox, follow these simple instructions:   - Notify surgeon immediately if any viji bleeding is noted in the urine, stool, emesis, or from the nose or the incision. Blood in the stool will often appear as black rather than red. Blood in urine may appear as pink. Blood in emesis may appear as brown/black like coffee grounds.  - You will need to apply pressure for longer periods of time to any cuts or abrasions to stop bleeding  - Avoid alcohol while taking anticoagulants  - Most anticoagulants are to be taken for 30 days postoperatively. After this time, you may stop using them unless instructed otherwise.   - If you were already taking an anticoagulant (commonly Aspirin, Coumadin, or Plavix) you will likely be resuming your normal dose postoperatively and will be continuing that medication at the discretion of the prescribing physician.  o Stool Softeners: You will be at greater risk of constipation after surgery due to  being less mobile and the pain medications.  - Take stool softeners as needed. Over the counter Colace 100 mg 1-2 capsules twice daily can be taken.  - If stools become too loose or too frequent, please decreases the dosage or stop the stool softener.  - If constipation occurs despite use of stool softeners, you are to continue the stool softeners and add a laxative (Milk of Magnesia 1 ounce daily as needed)  - Drink plenty of fluids, and eat fruits and vegetables during your recovery time. Getting up and mobilizing will help the bowels to recover their regular function, as will weaning off of all narcotics when the pain becomes tolerable.  o Pain Medications: Utilized after surgery are narcotics. This is some general information about these medications.  - CLASSIFICATION: Pain medications are called Opioids and are narcotics  - LEGALITIES: It is illegal to share narcotics with others  - DRIVING: it is illegal to drive while under the influence of narcotics. Doing so is a DUI.  - POTENTIAL SIDE EFFECTS: nausea, vomiting, itching, dizziness, drowsiness, dry mouth, constipation, and difficulty urinating.  - POTENTIAL ADVERSE EFFECTS:  - Opioid tolerance can develop with use of pain medications and this simply means that it requires more and more of the medication to control pain. However, this is seen more in patients that use opioids for longer periods of time.  - Opioid dependence can develop with use of Opioids. People with opioid dependence will experience withdrawal symptoms upon cessation of the medication.  - Opioid addiction can develop with use of Opioids. The incidence of this is very unlikely in patients who take the medications as ordered and stop the medications as instructed.  - Opioid overdose can be dangerous, but is unlikely when the medication is taken as ordered and stopped when ordered. It is important not to mix opioids with alcohol as this can lead to over sedation and respiratory  difficulty.  - DOSAGE:  - After the initial surgical pain begins to resolve, you may begin to decrease the pain medication. By the end of a few weeks, you should be off of pain medications.  - Refills will not be given by the office during evening hours, on weekends, or after 6 weeks post-op. You are responsible for weaning off of pain medication. You can increase the time between narcotic pills, taking one every 4 then 6 then 8 hours and so on.  - To seek refills on pain medications during the initial 6-week post-operative period, you must call the office to request the refill. The office will then notify you when to  the prescription. DO NOT wait until you are out of the medication to request a refill. Prescriptions will not be filled over the weekend and depending on the schedule, it may take a couple days for the prescription to be available. Someone will have to pick the prescription in person at the office.    • FOLLOW-UP VISITS  o You will need to follow up in the office with Dr Valadez in 2 weeks, or as instructed elsewhere in your discharge paperwork. Please call this number [phone] to schedule this appointment   o If you have any concerns or suspected complications prior to your follow up visit, please call the office. Do not wait until your appointment time if you suspect complications. These will need to be addressed in the office as soon as possible

## 2021-08-05 NOTE — PROGRESS NOTES
"DAILY PROGRESS NOTE  [facility]    Patient Identification:  Name: Bernie Thomas  Age: 89 y.o.  Sex: female  :  1932  MRN: [id number]         Primary Care Physician: Bella George APRN      Subjective  Patient with no unusual postop complaints.  Overall doing very nicely.  Patient's daughter is also at bedside.  On questioning, the patient is unaware of any previous renal issues.  Also unaware of previous history of anemia.      Objective:  General Appearance:  Comfortable, well-appearing, in no acute distress and not in pain.    Vital signs: (most recent): Blood pressure 127/63, pulse 66, temperature 97.8 °F (36.6 °C), temperature source Skin, resp. rate 16, height 157.5 cm (62\"), weight 47.4 kg (104 lb 6.4 oz), SpO2 96 %.    Lungs:  Normal effort and normal respiratory rate.  Breath sounds clear to auscultation.    Heart: Normal rate.  Regular rhythm.    Extremities: There is no dependent edema.    Neurological: Patient is alert and oriented to person, place and time.    Skin:  Warm and dry.                Vital signs in last 24 hours:  Temp:  [97.2 °F (36.2 °C)-98.9 °F (37.2 °C)] 97.8 °F (36.6 °C)  Heart Rate:  [66-94] 66  Resp:  [16] 16  BP: (121-153)/(63-90) 127/63    Intake/Output:    Intake/Output Summary (Last 24 hours) at 2021 1434  Last data filed at 2021 1333  Gross per 24 hour   Intake 2010 ml   Output 1100 ml   Net 910 ml         Results from last 7 days   Lab Units 21  0439 21  1616   WBC 10*3/mm3  --  7.98   HEMOGLOBIN g/dL 9.0* 10.2*   PLATELETS 10*3/mm3  --  210     Results from last 7 days   Lab Units 21  1225 21  0439 21  1616   SODIUM mmol/L 134* 133* 138   POTASSIUM mmol/L 5.0 5.6* 4.5   CHLORIDE mmol/L 98 102 105   CO2 mmol/L 24.9 23.6 24.7   BUN mg/dL 35* 34* 53*   CREATININE mg/dL 1.91* 1.67* 2.00*   GLUCOSE mg/dL 107* 145* 115*   Estimated Creatinine Clearance: 14.9 mL/min (A) (by C-G formula based on SCr of 1.91 mg/dL " (H)).  Results from last 7 days   Lab Units 08/05/21  1225 08/05/21  0439 07/31/21  1616   CALCIUM mg/dL 8.9 8.8 9.7   ALBUMIN g/dL  --   --  4.20     Results from last 7 days   Lab Units 07/31/21  1616   ALBUMIN g/dL 4.20   BILIRUBIN mg/dL 0.2   ALK PHOS U/L 91   AST (SGOT) U/L 17   ALT (SGPT) U/L 15       Assessment:    Femoral neck stress fracture    Renal insufficiency: Likely chronic kidney disease stage IV but there is a deficit of information between January 2020 and this admission.  On review of records she had a creatinine clearance of 50 in January 2020.  On presentation her creatinine clearance was 23.  Creatinine level itself at 2.0.  1.9 this morning.  BUN was elevated at 53.  Improved at 35 this morning.  BUN/creatinine ratio back within normal limits.  Will gently hydrate overnight with normal saline.  Recheck renal panel in the morning.  If renal function stable or improved then okay for discharge but with very close outpatient follow-up.  I discussed this with the patient and her daughter as well as avoidance of nephrotoxic drugs such as NSAIDs etc.    Anemia: Likely anemia of chronic disease/chronic kidney disease.  Minimal drop postop.  We will recheck CBC in the morning with an iron panel and retake count.    Hyperkalemia: Some of this may been a lab issue noted in the improvement without intervention.  We do need avoid high potassium intake and ACE inhibitor's etc. for the time being.  This will be rechecked in the morning.    Atrial fibrillation: Continue beta-blockers.  Eliquis is already been resumed.  Heart rhythm presently pretty regular.  This may be PAF    All problems new to this examiner.      Plan:  Please see above.  We certainly appreciate Minivelle this pleasant lady's care will be happy to follow along with her.    Anselmo Santacruz MD  8/5/2021  14:34 EDT

## 2021-08-05 NOTE — DISCHARGE PLACEMENT REQUEST
"Kade Thomas Ivan (89 y.o. Female)     Date of Birth Social Security Number Address Home Phone MRN    05/11/1932  [address] [place] [address] [phone] [id number]    Sikhism Marital Status          Gnosticist        Admission Date Admission Type Admitting Provider Attending Provider Department, Room/Bed    8/4/21 Elective Mark Valadez MD Keller, Tyler, MD [address], P783/1    Discharge Date Discharge Disposition Discharge Destination                       Attending Provider: Mark Valadez MD    Allergies: No Known Allergies    Isolation: None   Infection: None   Code Status: CPR    Ht: 157.5 cm (62\")   Wt: 47.4 kg (104 lb 6.4 oz)    Admission Cmt: None   Principal Problem: None                Active Insurance as of 8/4/2021     Primary Coverage     Payor Plan Insurance Group Employer/Plan Group    MEDICARE MEDICARE A & B      Payor Plan Address Payor Plan Phone Number Payor Plan Fax Number Effective Dates    [address] [phone]  5/1/1997 - None Entered    [facility] [id number]       Subscriber Name Subscriber Birth Date Member ID       KADE THOMAS IVAN 5/11/1932 [id number]           Secondary Coverage     Payor Plan Insurance Group Employer/Plan Group    [place] [employer] [place] [employer]      Payor Plan Address Payor Plan Phone Number Payor Plan Fax Number Effective Dates    [address] [facility] [phone]  1/15/2020 - None Entered    [facility] [address]       Subscriber Name Subscriber Birth Date Member ID       KADE THOMAS IVAN 5/11/1932 [id number]                 Emergency Contacts      (Rel.) Home Phone Work Phone Mobile Phone    NAYE KRUGER (Daughter) [phone] -- --    ADELFOKALEBJOSEPHINE (Daughter) [phone] -- --              "

## 2021-08-05 NOTE — PLAN OF CARE
Goal Outcome Evaluation:  Plan of Care Reviewed With: patient, daughter           Outcome Summary: POD1 percutaneous R hip pinning. Pt lives alone and independent at baseline. Pt this date demo'd decreased endruance, balance, strength, and inc'd pain. VC's for transfers and ambulation. Peyman to assist with navigating RW 80'. Pt returned to room and was seated up in the chair and performed exercises. Pt currently would benefit from SNF upon discharge. Pending progress made at [facility], pt may be safe to return home with 24/7 assist and HH.

## 2021-08-05 NOTE — THERAPY EVALUATION
Patient Name: Bernie Thomas  : 1932    MRN: [id number]                              Today's Date: 2021       Admit Date: 2021    Visit Dx:     ICD-10-CM ICD-9-CM   1. Stress fracture of neck of femur, initial encounter  M84.353A 733.96     Patient Active Problem List   Diagnosis   • Left displaced femoral neck fracture (CMS/HCC)   • Wedge compression fracture of t9-t10 vertebra, initial encounter for closed fracture (CMS/HCC)   • Wedge compression fracture of fourth lumbar vertebra, initial encounter for closed fracture (CMS/HCC)   • Osteoporosis   • Carpal tunnel syndrome   • Atrial fibrillation (CMS/HCC)   • Arthropathy of lumbar facet joint   • Femoral neck stress fracture     Past Medical History:   Diagnosis Date   • A-fib (CMS/HCC)    • A-fib (CMS/HCC)    • Hiatal hernia    • History of transfusion    • Osteoporosis      Past Surgical History:   Procedure Laterality Date   • CATARACT EXTRACTION, BILATERAL     • FEMUR IM NAILING/RODDING Right 2021    Procedure: PERCUTANEOUS PINNING OF RIGHT HIP;  Surgeon: Mark Valadez MD;  Location: [facility];  Service: Orthopedics;  Laterality: Right;   • HIP ENDOPROSTHESIS Left 2020    Procedure: LEFT HIP BI-POLAR;  Surgeon: Desmond Mcdonnell MD;  Location: [facility];  Service: Orthopedics   • HYSTERECTOMY       General Information     Row Name 21 1311          Physical Therapy Time and Intention    Document Type  evaluation  -DB     Mode of Treatment  individual therapy;physical therapy  -DB     Row Name 21 131          General Information    Patient Profile Reviewed  yes  -DB     Prior Level of Function  independent:  -DB     Existing Precautions/Restrictions  fall  -DB     Barriers to Rehab  none identified  -DB     Row Name 21 1311          Living Environment    Lives With  alone  -DB     Row Name 21 1311          Home Main Entrance    Number of Stairs, Main Entrance  one  -DB     Stair Railings, Main Entrance   other (see comments) pt has grab bar  -DB     Row Name 08/05/21 1311          Stairs Within Home, Primary    Number of Stairs, Within Home, Primary  none  -DB     Row Name 08/05/21 1311          Cognition    Orientation Status (Cognition)  oriented x 4  -DB     Row Name 08/05/21 1311          Safety Issues, Functional Mobility    Impairments Affecting Function (Mobility)  balance;strength;endurance/activity tolerance;pain  -DB       User Key  (r) = Recorded By, (t) = Taken By, (c) = Cosigned By    Initials Name Provider Type    DB Olivia Senior PT Physical Therapist        Mobility     Row Name 08/05/21 1322          Bed Mobility    Bed Mobility  supine-sit  -DB     Supine-Sit [place] (Bed Mobility)  standby assist;verbal cues  -DB     Assistive Device (Bed Mobility)  bed rails;head of bed elevated  -DB     Row Name 08/05/21 1322          Sit-Stand Transfer    Sit-Stand [place] (Transfers)  contact guard;verbal cues  -DB     Assistive Device (Sit-Stand Transfers)  walker, front-wheeled  -DB     Row Name 08/05/21 1322          Gait/Stairs (Locomotion)    [place] Level (Gait)  contact guard;verbal cues;nonverbal cues (demo/gesture);minimum assist (75% patient effort)  -DB     Assistive Device (Gait)  walker, front-wheeled  -DB     Distance in Feet (Gait)  80'  -DB     Deviations/Abnormal Patterns (Gait)  gait speed decreased;festinating/shuffling  -DB     Bilateral Gait Deviations  forward flexed posture;heel strike decreased  -DB     Comment (Gait/Stairs)  Peyman to assist with navigation of RW  -DB       User Key  (r) = Recorded By, (t) = Taken By, (c) = Cosigned By    Initials Name Provider Type    DB Olivia Senior PT Physical Therapist        Obj/Interventions     Row Name 08/05/21 1323          Range of Motion Comprehensive    General Range of Motion  no range of motion deficits identified  -DB     Row Name 08/05/21 1323          Strength Comprehensive (MMT)    Comment, General Manual Muscle  Testing (MMT) Assessment  BLE MMT >3/5  -DB     Row Name 08/05/21 1323          Motor Skills    Therapeutic Exercise  other (see comments) seated MIP, LAQ, AP, GS x10  -DB     Row Name 08/05/21 1323          Balance    Balance Assessment  sitting static balance;sitting dynamic balance;standing static balance;standing dynamic balance  -DB     Static Sitting Balance  WFL  -DB     Dynamic Sitting Balance  WFL  -DB     Static Standing Balance  WFL;supported  -DB     Dynamic Standing Balance  mild impairment;supported  -DB     Balance Interventions  sitting;standing;sit to stand  -DB       User Key  (r) = Recorded By, (t) = Taken By, (c) = Cosigned By    Initials Name Provider Type    DB Olivia Senior, PT Physical Therapist        Goals/Plan     Row Name 08/05/21 1333          Bed Mobility Goal 1 (PT)    Activity/Assistive Device (Bed Mobility Goal 1, PT)  bed mobility activities, all  -DB     [place] Level/Cues Needed (Bed Mobility Goal 1, PT)  supervision required  -DB     Time Frame (Bed Mobility Goal 1, PT)  1 week  -DB     Row Name 08/05/21 1333          Transfer Goal 1 (PT)    Activity/Assistive Device (Transfer Goal 1, PT)  transfers, all  -DB     [place] Level/Cues Needed (Transfer Goal 1, PT)  supervision required  -DB     Time Frame (Transfer Goal 1, PT)  1 week  -DB     Row Name 08/05/21 1333          Gait Training Goal 1 (PT)    Activity/Assistive Device (Gait Training Goal 1, PT)  gait (walking locomotion)  -DB     [place] Level (Gait Training Goal 1, PT)  standby assist  -DB     Time Frame (Gait Training Goal 1, PT)  1 week  -DB     Row Name 08/05/21 1333          Stairs Goal 1 (PT)    Activity/Assistive Device (Stairs Goal 1, PT)  stairs, all skills  -DB     [place] Level/Cues Needed (Stairs Goal 1, PT)  contact guard assist  -DB     Number of Stairs (Stairs Goal 1, PT)  1  -DB     Time Frame (Stairs Goal 1, PT)  1 week  -DB       User Key  (r) = Recorded By, (t) = Taken By, (c) =  Cosigned By    Initials Name Provider Type    Olivia Hager, ALVAREZ Physical Therapist        Clinical Impression     Row Name 08/05/21 1324          Pain    Additional Documentation  Pain Scale: Numbers Pre/Post-Treatment (Group)  -DB     Row Name 08/05/21 1324          Pain Scale: Numbers Pre/Post-Treatment    Pretreatment Pain Rating  2/10  -DB     Posttreatment Pain Rating  2/10  -DB     Pain Intervention(s)  Ambulation/increased activity;Repositioned  -DB     Row Name 08/05/21 1324          Plan of Care Review    Plan of Care Reviewed With  patient;daughter  -DB     Outcome Summary  POD1 percutaneous R hip pinning. Pt lives alone and independent at baseline. Pt this date demo'd decreased endruance, balance, strength, and inc'd pain. VC's for transfers and ambulation. Peyman to assist with navigating RW 80'. Pt returned to room and was seated up in the chair and performed exercises. Pt currently would benefit from SNF upon discharge. Pending progress made at [facility], pt may be safe to return home with 24/7 assist and HH.  -DB     Row Name 08/05/21 1324          Therapy Assessment/Plan (PT)    Rehab Potential (PT)  good, to achieve stated therapy goals  -DB     Criteria for Skilled Interventions Met (PT)  yes  -DB     Row Name 08/05/21 1324          Vital Signs    O2 Delivery Pre Treatment  room air  -DB     O2 Delivery Intra Treatment  room air  -DB     O2 Delivery Post Treatment  room air  -DB     Pre Patient Position  Supine  -DB     Intra Patient Position  Standing  -DB     Post Patient Position  Sitting  -DB     Row Name 08/05/21 1324          Positioning and Restraints    Pre-Treatment Position  in bed  -DB     Post Treatment Position  chair  -DB     In Chair  reclined;sitting;call light within reach;encouraged to call for assist;with family/caregiver  -DB       User Key  (r) = Recorded By, (t) = Taken By, (c) = Cosigned By    Initials Name Provider Type    Olivia Hager, ALVAREZ Physical Therapist         Outcome Measures     Row Name 08/05/21 1334          How much help from another person do you currently need...    Turning from your back to your side while in flat bed without using bedrails?  3  -DB     Moving from lying on back to sitting on the side of a flat bed without bedrails?  3  -DB     Moving to and from a bed to a chair (including a wheelchair)?  3  -DB     Standing up from a chair using your arms (e.g., wheelchair, bedside chair)?  4  -DB     Climbing 3-5 steps with a railing?  2  -DB     To walk in hospital room?  3  -DB     AM-PAC 6 Clicks Score (PT)  18  -DB     Row Name 08/05/21 1334          Functional Assessment    Outcome Measure Options  AM-PAC 6 Clicks Basic Mobility (PT)  -DB       User Key  (r) = Recorded By, (t) = Taken By, (c) = Cosigned By    Initials Name Provider Type    DB Olivia Senior, PT Physical Therapist                       Physical Therapy Education                 Title: PT OT SLP Therapies (Done)     Topic: Physical Therapy (Done)     Point: Mobility training (Done)     Learning Progress Summary           Patient Acceptance, E, VU by DB at 8/5/2021 1334                   Point: Home exercise program (Done)     Learning Progress Summary           Patient Acceptance, E, VU by DB at 8/5/2021 1334                   Point: Body mechanics (Done)     Learning Progress Summary           Patient Acceptance, E, VU by DB at 8/5/2021 1334                   Point: Precautions (Done)     Learning Progress Summary           Patient Acceptance, E, VU by DB at 8/5/2021 1334                               User Key     Initials Effective Dates Name Provider Type Discipline    DB 06/16/21 -  Olivia Senior, ALVAREZ Physical Therapist PT              PT Recommendation and Plan  Planned Therapy Interventions (PT): balance training, bed mobility training, gait training, home exercise program, postural re-education, patient/family education, neuromuscular re-education, stair training,  strengthening, transfer training  Plan of Care Reviewed With: patient, daughter  Outcome Summary: POD1 percutaneous R hip pinning. Pt lives alone and independent at baseline. Pt this date demo'd decreased endruance, balance, strength, and inc'd pain. VC's for transfers and ambulation. Peyman to assist with navigating RW 80'. Pt returned to room and was seated up in the chair and performed exercises. Pt currently would benefit from SNF upon discharge. Pending progress made at [facility], pt may be safe to return home with 24/7 assist and HH.     Time Calculation:   PT Charges     Row Name 08/05/21 1335             Time Calculation    Start Time  0948  -DB      Stop Time  1019  -DB      Time Calculation (min)  31 min  -DB      PT Received On  08/05/21  -DB      PT - Next Appointment  08/06/21  -DB      PT Goal Re-Cert Due Date  08/12/21  -DB         Time Calculation- PT    Total Timed Code Minutes- PT  23 minute(s)  -DB        User Key  (r) = Recorded By, (t) = Taken By, (c) = Cosigned By    Initials Name Provider Type    DB Olivia Senior, PT Physical Therapist        Therapy Charges for Today     Code Description Service Date Service Provider Modifiers Qty    62752371862 HC PT EVAL MOD COMPLEXITY 2 8/5/2021 Olivia Senior, PT GP 1    52734466963 HC PT THER PROC EA 15 MIN 8/5/2021 Olivia Senior, PT GP 2          PT G-Codes  Outcome Measure Options: AM-PAC 6 Clicks Basic Mobility (PT)  AM-PAC 6 Clicks Score (PT): 18    Oilvia Senior PT  8/5/2021

## 2021-08-05 NOTE — CASE MANAGEMENT/SOCIAL WORK
Discharge Planning Assessment  [facility]     Patient Name: Bernie Thomas  MRN: [id number]  Today's Date: 8/5/2021    Admit Date: 8/4/2021    Discharge Needs Assessment     Row Name 08/05/21 1602       Living Environment    Lives With  alone    Current Living Arrangements  home/apartment/condo    Primary Care Provided by  self    Provides Primary Care For  no one    Family Caregiver if Needed  child(elsa), adult    Quality of Family Relationships  helpful;involved;supportive       Resource/Environmental Concerns    Transportation Concerns  car, none       Transition Planning    Patient/Family Anticipates Transition to  home with family;home with help/services    Patient/Family Anticipated Services at Transition      Transportation Anticipated  family or friend will provide;health plan transportation       Discharge Needs Assessment    Readmission Within the Last 30 Days  no previous admission in last 30 days    Equipment Currently Used at Home  walker, rolling;grab bar;cane, straight;shower chair;commode;wheelchair    Discharge Facility/Level of Care Needs  home with home health;nursing facility, skilled        Discharge Plan     Row Name 08/05/21 1603       Plan    Plan  Home with family support, A HH & an In-Home Caregiver (set up by the patient) vs [facility].    Patient/Family in Agreement with Plan  yes    Plan Comments  Spoke with the patient, verified current information and explained the role of the CCP. Patient said she has very good family support. She's IADL and has a cane, walker, wheelchair, shower chair, commode chair and grab bars. She has no history with SNF, and said she is current with [facility]. Spoke with Love/Lucretia who said the patient is pre-registered at [facility] and she has a SNF bed for her today. Physical Therapy eval/rec noted. Patient said, at this time, she plans to d/c home with family support, an in-home caregiver and [facility]. A list of  In-Home Caregivers was provided to the patient at bedside. The patient however also said she would like to see how she does with Physical Therapy tomorrow before making a final decision on whether she needs SNF placement. CCP following for appropriate d/c needs.        Continued Care and Services - Admitted Since 8/4/2021     Home Medical Care     Service Provider Request Status Selected Services Address Phone Fax Patient Preferred    [facility] - Request Sent N/A [id number] [place] [id number] [phone] --                Demographic Summary     Row Name 08/05/21 1601       General Information    Admission Type  inpatient    Reason for Consult  discharge planning    Preferred Language  English     Used During This Interaction  no       Contact Information    Permission Granted to Share Info With  ;family/designee        Functional Status     Row Name 08/05/21 1601       Functional Status    Usual Activity Tolerance  good    Current Activity Tolerance  moderate       Functional Status, IADL    Medications  independent    Meal Preparation  assistive equipment    Housekeeping  assistive equipment    Laundry  assistive equipment    Shopping  assistive equipment       Mental Status Summary    Recent Changes in Mental Status/Cognitive Functioning  no changes        Psychosocial     Row Name 08/05/21 1602       Intellectual Performance WDL    Level of Consciousness  Alert       Coping/Stress    Patient Personal Strengths  able to adapt    Sources of Support  adult child(elsa)    Reaction to Health Status  accepting    Understanding of Condition and Treatment  adequate understanding of medical condition       Developmental Stage (Eriksson's)    Developmental Stage  Stage 8 (65 years-death/Late Adulthood) Integrity vs. Despair        Abuse/Neglect    No documentation.       Legal    No documentation.       Substance Abuse    No  documentation.       Patient Forms    No documentation.           Sarah Sena RN

## 2021-08-05 NOTE — PLAN OF CARE
"Goal Outcome Evaluation:     89 yo female, POD 0. R hip percutaneous pinning. WBAT. Pt reported R hip pain \"for weeks\" Imaging showed stress fracture. Pt denied any mechanical falls, but has history of osteoporosis. Discharge plan to SNF within a few days. At this time, no pending precertification noted in chart.              "

## 2021-08-05 NOTE — PROGRESS NOTES
Mark Valadez MD     Orthopedic Progress Note    Subjective :   The patient is doing very well.  She did have some nausea yesterday it has resolved.  She has ambulated with physical therapy with a walker.  She endorses that her hip pain is already improved versus her preoperative state.  She notes some congestion in her sinuses today.  No chest pain or shortness of breath.    Objective :    Vital signs in last 24 hours:  Temp:  [97.2 °F (36.2 °C)-98.9 °F (37.2 °C)] 97.3 °F (36.3 °C)  Heart Rate:  [68-94] 69  Resp:  [16-18] 16  BP: (121-156)/(59-90) 121/65  Vitals:    08/04/21 1958 08/04/21 2219 08/05/21 0219 08/05/21 0725   BP: 146/68 122/69 129/69 121/65   BP Location: Right arm Left arm Left arm Right arm   Patient Position: Lying Lying Lying Sitting   Pulse:  70 72 69   Resp: 16 16 16 16   Temp: 97.8 °F (36.6 °C) 98.9 °F (37.2 °C) 98.6 °F (37 °C) 97.3 °F (36.3 °C)   TempSrc: Oral Skin Skin Skin   SpO2: 100% 97% 98% 97%   Weight:       Height:           PHYSICAL EXAM:  Patient is calm, in no acute distress, awake and oriented x 3.  Dressing clean, dry and intact.  No signs of infection.  Swelling is appropriate.  Ecchymosis is appropriate in amount.  Homans test is negative.  Patient is neurovascularly intact distally.  The right lateral hip dressing has a small area of spotting distally.  No fluctuance no evidence of hematoma in the thigh.    LABS:  Results from last 7 days   Lab Units 08/05/21  0439 07/31/21  1616 07/31/21  1616   WBC 10*3/mm3  --   --  7.98   HEMOGLOBIN g/dL 9.0*   < > 10.2*   HEMATOCRIT % 27.9*   < > 31.0*   PLATELETS 10*3/mm3  --   --  210    < > = values in this interval not displayed.     Results from last 7 days   Lab Units 08/05/21  0439   SODIUM mmol/L 133*   POTASSIUM mmol/L 5.6*   CHLORIDE mmol/L 102   CO2 mmol/L 23.6   BUN mg/dL 34*   CREATININE mg/dL 1.67*   GLUCOSE mg/dL 145*   CALCIUM mg/dL 8.8           Intake/Output                 08/04/21 0701 - 08/05/21 0700     0210-6657  9672-4192 Total              Intake    P.O.  120  -- 120    I.V.  100  900 1000    Total Intake        Output    Urine  250  650 900    Total Output 250 650 900           ASSESSMENT:  Postoperative day #1 status post percutaneous pinning right femoral neck fracture    Plan:  Continue Physical Therapy, weightbearing as tolerated to the right lower extremity, ambulate with a walker.  Continue SCDs, patient does take Eliquis for atrial fibrillation chronically, we have resumed this  Urology consulted for hydronephrosis, chronic condition no further intervention needed.  Appreciate assistance    A consulted and following along for medical management.  Patient does have hyperkalemia this morning.  We will hold her home potassium.  Repeat labs ordered for this afternoon.  Further management per hospitalist  Disposition: Plan for discharge likely to inpatient rehab as patient is a fall risk, when medically appropriate    Mark Valadez MD    Date: 8/5/2021  Time: 07:34 EDT    Mark Valadez MD  Orthopaedic Surgeon    [facility]  [phone]         Medical Necessity Information: It is in your best interest to select a reason for this procedure from the list below. All of these items fulfill various CMS LCD requirements except the new and changing color options. Render Note In Bullet Format When Appropriate: No Medical Necessity Clause: This procedure was medically necessary because the lesions that were treated were:irritated Treatment Number (Will Not Render If 0): 1 Anesthesia Volume In Cc: 0.5 Post-Care Instructions: I reviewed with the patient in detail post-care instructions. Patient is to wear sunprotection, and avoid picking at any of the treated lesions. Pt may apply Vaseline to crusted or scabbing areas. Detail Level: Simple Render Post-Care Instructions In Note?: yes Consent: The patient's consent was obtained including but not limited to risks of crusting, scabbing, blistering, scarring, darker or lighter pigmentary change, recurrence, incomplete removal and infection.

## 2021-08-06 VITALS
WEIGHT: 104.4 LBS | RESPIRATION RATE: 16 BRPM | BODY MASS INDEX: 19.21 KG/M2 | HEART RATE: 94 BPM | HEIGHT: 62 IN | SYSTOLIC BLOOD PRESSURE: 176 MMHG | TEMPERATURE: 97.1 F | OXYGEN SATURATION: 97 % | DIASTOLIC BLOOD PRESSURE: 82 MMHG

## 2021-08-06 LAB
ALBUMIN SERPL-MCNC: 3.3 G/DL (ref 3.5–5.2)
ANION GAP SERPL CALCULATED.3IONS-SCNC: 11.5 MMOL/L (ref 5–15)
BASOPHILS # BLD AUTO: 0.05 10*3/MM3 (ref 0–0.2)
BASOPHILS NFR BLD AUTO: 0.5 % (ref 0–1.5)
BUN SERPL-MCNC: 38 MG/DL (ref 8–23)
BUN/CREAT SERPL: 21.6 (ref 7–25)
CALCIUM SPEC-SCNC: 8.5 MG/DL (ref 8.6–10.5)
CHLORIDE SERPL-SCNC: 102 MMOL/L (ref 98–107)
CO2 SERPL-SCNC: 22.5 MMOL/L (ref 22–29)
CREAT SERPL-MCNC: 1.76 MG/DL (ref 0.57–1)
DEPRECATED RDW RBC AUTO: 40.1 FL (ref 37–54)
EOSINOPHIL # BLD AUTO: 0.23 10*3/MM3 (ref 0–0.4)
EOSINOPHIL NFR BLD AUTO: 2.3 % (ref 0.3–6.2)
ERYTHROCYTE [DISTWIDTH] IN BLOOD BY AUTOMATED COUNT: 11.8 % (ref 12.3–15.4)
FOLATE SERPL-MCNC: >20 NG/ML (ref 4.78–24.2)
GFR SERPL CREATININE-BSD FRML MDRD: 27 ML/MIN/1.73
GLUCOSE SERPL-MCNC: 102 MG/DL (ref 65–99)
HCT VFR BLD AUTO: 25.7 % (ref 34–46.6)
HGB BLD-MCNC: 8.3 G/DL (ref 12–15.9)
IMM GRANULOCYTES # BLD AUTO: 0.06 10*3/MM3 (ref 0–0.05)
IMM GRANULOCYTES NFR BLD AUTO: 0.6 % (ref 0–0.5)
IRON 24H UR-MRATE: 24 MCG/DL (ref 37–145)
IRON SATN MFR SERPL: 9 % (ref 20–50)
LYMPHOCYTES # BLD AUTO: 1.99 10*3/MM3 (ref 0.7–3.1)
LYMPHOCYTES NFR BLD AUTO: 19.5 % (ref 19.6–45.3)
MCH RBC QN AUTO: 30.3 PG (ref 26.6–33)
MCHC RBC AUTO-ENTMCNC: 32.3 G/DL (ref 31.5–35.7)
MCV RBC AUTO: 93.8 FL (ref 79–97)
MONOCYTES # BLD AUTO: 0.87 10*3/MM3 (ref 0.1–0.9)
MONOCYTES NFR BLD AUTO: 8.5 % (ref 5–12)
NEUTROPHILS NFR BLD AUTO: 6.98 10*3/MM3 (ref 1.7–7)
NEUTROPHILS NFR BLD AUTO: 68.6 % (ref 42.7–76)
NRBC BLD AUTO-RTO: 0 /100 WBC (ref 0–0.2)
PHOSPHATE SERPL-MCNC: 3.1 MG/DL (ref 2.5–4.5)
PLATELET # BLD AUTO: 209 10*3/MM3 (ref 140–450)
PMV BLD AUTO: 10.3 FL (ref 6–12)
POTASSIUM SERPL-SCNC: 4.9 MMOL/L (ref 3.5–5.2)
RBC # BLD AUTO: 2.74 10*6/MM3 (ref 3.77–5.28)
RETICS # AUTO: 0.04 10*6/MM3 (ref 0.02–0.13)
RETICS/RBC NFR AUTO: 1.49 % (ref 0.7–1.9)
SODIUM SERPL-SCNC: 136 MMOL/L (ref 136–145)
TIBC SERPL-MCNC: 261 MCG/DL (ref 298–536)
TRANSFERRIN SERPL-MCNC: 175 MG/DL (ref 200–360)
VIT B12 BLD-MCNC: 1243 PG/ML (ref 211–946)
WBC # BLD AUTO: 10.18 10*3/MM3 (ref 3.4–10.8)

## 2021-08-06 PROCEDURE — 85045 AUTOMATED RETICULOCYTE COUNT: CPT | Performed by: HOSPITALIST

## 2021-08-06 PROCEDURE — 83540 ASSAY OF IRON: CPT | Performed by: HOSPITALIST

## 2021-08-06 PROCEDURE — 82607 VITAMIN B-12: CPT | Performed by: HOSPITALIST

## 2021-08-06 PROCEDURE — 80069 RENAL FUNCTION PANEL: CPT | Performed by: HOSPITALIST

## 2021-08-06 PROCEDURE — 85025 COMPLETE CBC W/AUTO DIFF WBC: CPT | Performed by: HOSPITALIST

## 2021-08-06 PROCEDURE — 82746 ASSAY OF FOLIC ACID SERUM: CPT | Performed by: HOSPITALIST

## 2021-08-06 PROCEDURE — 84466 ASSAY OF TRANSFERRIN: CPT | Performed by: HOSPITALIST

## 2021-08-06 PROCEDURE — 97110 THERAPEUTIC EXERCISES: CPT

## 2021-08-06 RX ADMIN — PANTOPRAZOLE SODIUM 40 MG: 40 TABLET, DELAYED RELEASE ORAL at 07:38

## 2021-08-06 RX ADMIN — HYDROCODONE BITARTRATE AND ACETAMINOPHEN 2 TABLET: 5; 325 TABLET ORAL at 07:38

## 2021-08-06 RX ADMIN — HYDROCODONE BITARTRATE AND ACETAMINOPHEN 2 TABLET: 5; 325 TABLET ORAL at 02:05

## 2021-08-06 RX ADMIN — Medication 2 WAFER: at 07:38

## 2021-08-06 RX ADMIN — CYCLOSPORINE 1 DROP: 0.5 EMULSION OPHTHALMIC at 07:38

## 2021-08-06 RX ADMIN — METOPROLOL TARTRATE 12.5 MG: 25 TABLET, FILM COATED ORAL at 07:38

## 2021-08-06 RX ADMIN — APIXABAN 2.5 MG: 2.5 TABLET, FILM COATED ORAL at 07:38

## 2021-08-06 NOTE — DISCHARGE SUMMARY
Discharge Summary    Date of Admission: 8/4/2021 10:41 AM    Date of Discharge:  8/6/2021    Discharge Diagnosis:   Femoral neck stress fracture [M84.353A]    PMHX:   Past Medical History:   Diagnosis Date   • A-fib (CMS/HCC)    • A-fib (CMS/[facility])    • Hiatal hernia    • History of transfusion    • Osteoporosis        Discharge Disposition  Home-Health Care [facility] [place]    Procedures Performed  Procedure(s):  PERCUTANEOUS PINNING OF RIGHT HIP       Indication for Admission  Patient is a 89 y.o. female is an established patient to our office.  Patient presented to our office with right hip pain.  Patient had MRI revealing a right femoral neck stress fracture.  Risks, benefits, outcomes of the procedure discussed and the patient wished to proceed. Obtained pre-operative medical clearance and found to be medically stable to undergo the above procedure. Patient tolerated the procedure well and was admitted after undergoing the above surgical procedure. They were admitted for post-operative pain control, medical management and physical therapy. The patient was started on antibiotics post-operatively per SCIP protocol and DVT prophylaxis started immediately. Patient was ambulatory on POD #0. Post-operative laboratory studies and vital signs remained stable. Patient was evaluated by physical therapy and found to be medically stable for discharge.     Consults:   Consults     Date and Time Order Name Status Description    8/4/2021  4:19 PM Inpatient Urology Consult Completed     8/4/2021  4:19 PM Inpatient Hospitalist Consult Completed           Discharge Instructions:  Patient is weight bearing as tolerated on the operative leg.   Patient is to progress ambulation as tolerated with walker.   Keep dressing in place 7 days. May change dressing before saturated or starts to fall off.  Patient will follow-up in the office at 2 weeks. Home health physical therapy will follow patient once patient is discharged home.   Call the  office at [phone] for any questions or concerns.      Discharge Medications     Discharge Medications      Changes to Medications      Instructions Start Date   HYDROcodone-acetaminophen 5-325 MG per tablet  Commonly known as: NORCO  What changed:   · how much to take  · when to take this  · reasons to take this   2 tablets, Oral, Every 6 Hours PRN         Continue These Medications      Instructions Start Date   acetaminophen 500 MG tablet  Commonly known as: TYLENOL   500 mg, Oral, Every 6 Hours PRN      apixaban 2.5 MG tablet tablet  Commonly known as: ELIQUIS   2.5 mg, Oral, 2 Times Daily      calcium citrate-vitamin d 200-250 MG-UNIT tablet tablet  Commonly known as: CITRACAL   Oral, Daily      cycloSPORINE 0.05 % ophthalmic emulsion  Commonly known as: RESTASIS   1 drop, Both Eyes, 2 Times Daily      metoprolol tartrate 25 MG tablet  Commonly known as: LOPRESSOR   12.5 mg, Oral, 2 Times Daily      MIRALAX PO   Oral, As Needed      multivitamin with minerals tablet tablet   1 tablet, Oral, Daily, [place] complete for iron       omeprazole 40 MG capsule  Commonly known as: priLOSEC   40 mg, Oral, Daily      psyllium 58.6 % packet  Commonly known as: METAMUCIL   1 packet, Oral, Daily             Discharge Diet: Regular diet    Activity at Discharge: Weight bearing as tolerated. Patient has no hip dislocation precautions.  Patient is to progress ambulation as tolerated with walker.      Follow-up Appointments: Follow-up Dr. Valadez's office in 2 weeks. Please call [phone] to schedule or confirm your appointment.     08/06/21,  07:58 [facility] PA-C    [facility]  [phone]

## 2021-08-06 NOTE — CASE MANAGEMENT/SOCIAL WORK
Case Management Discharge Note      Final Note: Home with [facility].         Selected Continued Care - Discharged on 8/6/2021 Admission date: 8/4/2021 - Discharge disposition: Home-Health Care Svc    Destination    No services have been selected for the patient.              Durable Medical Equipment    No services have been selected for the patient.              Dialysis/Infusion    No services have been selected for the patient.              Home Medical Care Coordination complete.    Service Provider Selected Services Address Phone Fax Patient Preferred    [facility] [address] [place] [address] [phone] --          Therapy    No services have been selected for the patient.              Community Resources    No services have been selected for the patient.              Community & DME    No services have been selected for the patient.                       Final Discharge Disposition Code: 06 - home with home health care

## 2021-08-06 NOTE — PLAN OF CARE
Goal Outcome Evaluation:  Plan of Care Reviewed With: patient, daughter        Progress: improving  Outcome Summary: POD2 L hip nailing. Pt able to ambulate to bathroom with S. Pt also agreeable to ambulate in hallway and practiced ascending and descending 1 step, CGA and step-to pattern. Performed exercises following and pt was seated up in chair. Pt is safe to go home with assist at this time.

## 2021-08-06 NOTE — PROGRESS NOTES
Orthopedic Progress Note    Subjective :   Patient seen and examined today.  Patient is sitting upright eating breakfast.  Patient states minimal pain of the right hip.  Patient states she has improved with physical therapy.     Objective :    Vital signs in last 24 hours:  Temp:  [96.4 °F (35.8 °C)-98.4 °F (36.9 °C)] 97.1 °F (36.2 °C)  Heart Rate:  [66-94] 94  Resp:  [16] 16  BP: (115-176)/(54-82) 176/82  Vitals:    08/05/21 1920 08/05/21 2046 08/05/21 2332 08/06/21 0719   BP: 122/64 122/64 120/64 176/82   BP Location: Left arm  Left arm Left arm   Patient Position: Sitting  Lying Sitting   Pulse: 76 76 77 94   Resp: 16  16 16   Temp: 96.4 °F (35.8 °C)  98.4 °F (36.9 °C) 97.1 °F (36.2 °C)   TempSrc: Skin  Skin Skin   SpO2: 96%  90% 97%   Weight:       Height:           PHYSICAL EXAM:  Patient is calm, in no acute distress, awake and oriented x 3.  Dressing small area of spotting distally and intact.  No fluctuance no evidence of hematoma in the thigh.  No signs of infection.  Swelling is appropriate.  Ecchymosis is appropriate in amount.  Homans test is negative.  Patient is neurovascularly intact distally.  Intact EHL, FHL, TA, GS.    LABS:  Results from last 7 days   Lab Units 08/06/21  0458   WBC 10*3/mm3 10.18   HEMOGLOBIN g/dL 8.3*   HEMATOCRIT % 25.7*   PLATELETS 10*3/mm3 209     Results from last 7 days   Lab Units 08/06/21  0458   SODIUM mmol/L 136   POTASSIUM mmol/L 4.9   CHLORIDE mmol/L 102   CO2 mmol/L 22.5   BUN mg/dL 38*   CREATININE mg/dL 1.76*   GLUCOSE mg/dL 102*   CALCIUM mg/dL 8.5*           ASSESSMENT:  Status post right percutaneous pinning right femoral neck fracture, postop day 02    Plan:  Continue Physical Therapy, weight-bear as tolerated to the right lower extremity, ambulate with a walker..  Continue SCDs, Continue Eliquis for atrial fibrillation chronically.  Dispo planning for home with home health services when medically stable, likely today.  Patient will follow up in Dr. Valadez's  office in 2 weeks.    Caroline Cerna PA-C    Date: 8/6/2021  Time: 07:53 EDT

## 2021-08-06 NOTE — PLAN OF CARE
Goal Outcome Evaluation:  Plan of Care Reviewed With: patient        Progress: improving  Outcome Summary: POD#2 OF LEF HIP NAILING. DRESSING TO HIP INTACT. VSS. PO PAIN MEDICATION HELPING WITH PAIN. AMBULATING FINE WITH ASSISTANCE, VOIDING PER BRP. EDUCATION PROVIDED ON THE REWARDS OF QUITTING SMOKING AND PAIN CONTROL,

## 2021-08-06 NOTE — CASE MANAGEMENT/SOCIAL WORK
Continued Stay Note  [facility]     Patient Name: Bernie Thomas  MRN: [id number]  Today's Date: 8/6/2021    Admit Date: 8/4/2021    Discharge Plan     Row Name 08/06/21 0924       Plan    Plan  Home with family support & [facility].    Patient/Family in Agreement with Plan  yes    Plan Comments  Discharge orders noted. Spoke with the patient who said she plans to d/c home with family support and [facility]. She does not want to d/c to SNF and asked [facility] to cancel [facility] at [facility]. Spoke with BarnettDamir who's agreeable. No other needs identified.        Discharge Codes    No documentation.       Expected Discharge Date and Time     Expected Discharge Date Expected Discharge Time    Aug 6, 2021             Sarah Sena RN

## 2021-08-06 NOTE — THERAPY TREATMENT NOTE
Patient Name: Bernie Thomas  : 1932    MRN: [id number]                              Today's Date: 2021       Admit Date: 2021    Visit Dx:     ICD-10-CM ICD-9-CM   1. Stress fracture of neck of femur, initial encounter  M84.353A 733.96     Patient Active Problem List   Diagnosis   • Left displaced femoral neck fracture (CMS/HCC)   • Wedge compression fracture of t9-t10 vertebra, initial encounter for closed fracture (CMS/HCC)   • Wedge compression fracture of fourth lumbar vertebra, initial encounter for closed fracture (CMS/HCC)   • Osteoporosis   • Carpal tunnel syndrome   • Atrial fibrillation (CMS/HCC)   • Arthropathy of lumbar facet joint   • Femoral neck stress fracture     Past Medical History:   Diagnosis Date   • A-fib (CMS/HCC)    • A-fib (CMS/HCC)    • Hiatal hernia    • History of transfusion    • Osteoporosis      Past Surgical History:   Procedure Laterality Date   • CATARACT EXTRACTION, BILATERAL     • FEMUR IM NAILING/RODDING Right 2021    Procedure: PERCUTANEOUS PINNING OF RIGHT HIP;  Surgeon: Mark Valadez MD;  Location: [facility];  Service: Orthopedics;  Laterality: Right;   • HIP ENDOPROSTHESIS Left 2020    Procedure: LEFT HIP BI-POLAR;  Surgeon: Desmond Mcdonnell MD;  Location: [facility];  Service: Orthopedics   • HYSTERECTOMY       General Information     Row Name 21 1151          Physical Therapy Time and Intention    Document Type  therapy note (daily note)  -DB     Mode of Treatment  individual therapy;physical therapy  -DB     Row Name 21 1151          General Information    Patient Profile Reviewed  yes  -DB     Row Name 21 1151          Cognition    Orientation Status (Cognition)  oriented x 4  -DB       User Key  (r) = Recorded By, (t) = Taken By, (c) = Cosigned By    Initials Name Provider Type    DB Olivia Senior PT Physical Therapist        Mobility     Row Name 21 1152          Bed Mobility    Bed Mobility  supine-sit   -DB     Supine-Sit [place] (Bed Mobility)  supervision  -DB     Assistive Device (Bed Mobility)  bed rails;head of bed elevated  -DB     Row Name 08/06/21 1152          Sit-Stand Transfer    Sit-Stand [place] (Transfers)  supervision  -DB     Assistive Device (Sit-Stand Transfers)  walker, front-wheeled  -DB     Row Name 08/06/21 1152          Gait/Stairs (Locomotion)    [place] Level (Gait)  standby assist  -DB     Assistive Device (Gait)  walker, front-wheeled  -DB     Distance in Feet (Gait)  100'  -DB     Deviations/Abnormal Patterns (Gait)  gait speed decreased;stride length decreased  -DB     Bilateral Gait Deviations  forward flexed posture;heel strike decreased  -DB     [place] Level (Stairs)  contact guard;verbal cues  -DB     Handrail Location (Stairs)  right side (ascending)  -DB     Number of Steps (Stairs)  1  -DB     Ascending Technique (Stairs)  step-to-step  -DB     Descending Technique (Stairs)  step-to-step  -DB       User Key  (r) = Recorded By, (t) = Taken By, (c) = Cosigned By    Initials Name Provider Type    Olivia Hager PT Physical Therapist        Obj/Interventions     Row Name 08/06/21 1153          Range of Motion Comprehensive    General Range of Motion  no range of motion deficits identified  -DB     Row Name 08/06/21 1153          Motor Skills    Therapeutic Exercise  other (see comments) seated AP, LAQ, MIP, GS x10  -DB     Row Name 08/06/21 1153          Balance    Balance Assessment  sitting static balance;sitting dynamic balance;standing static balance;standing dynamic balance  -DB     Static Sitting Balance  WFL  -DB     Dynamic Sitting Balance  WFL  -DB     Static Standing Balance  WFL  -DB     Dynamic Standing Balance  WFL  -DB     Balance Interventions  sitting;standing;sit to stand  -DB       User Key  (r) = Recorded By, (t) = Taken By, (c) = Cosigned By    Initials Name Provider Type    Olivia Hager PT Physical Therapist        Goals/Plan      Row Name 08/06/21 1156          Bed Mobility Goal 1 (PT)    Progress/Outcomes (Bed Mobility Goal 1, PT)  good progress toward goal  -DB     Row Name 08/06/21 1156          Transfer Goal 1 (PT)    Progress/Outcome (Transfer Goal 1, PT)  goal met  -DB     Row Name 08/06/21 1156          Gait Training Goal 1 (PT)    Progress/Outcome (Gait Training Goal 1, PT)  goal met  -DB     Row Name 08/06/21 1156          Stairs Goal 1 (PT)    Progress/Outcome (Stairs Goal 1, PT)  goal met  -DB       User Key  (r) = Recorded By, (t) = Taken By, (c) = Cosigned By    Initials Name Provider Type    DB Olivia Senior, PT Physical Therapist        Clinical Impression     Row Name 08/06/21 1154          Pain    Additional Documentation  Pain Scale: Numbers Pre/Post-Treatment (Group)  -DB     Row Name 08/06/21 1154          Pain Scale: Numbers Pre/Post-Treatment    Pretreatment Pain Rating  2/10  -DB     Posttreatment Pain Rating  2/10  -DB     Pain Intervention(s)  Ambulation/increased activity;Repositioned  -DB     Row Name 08/06/21 1154          Plan of Care Review    Plan of Care Reviewed With  patient;daughter  -DB     Progress  improving  -DB     Outcome Summary  POD2 L hip nailing. Pt able to ambulate to bathroom with S. Pt also agreeable to ambulate in hallway and practiced ascending and descending 1 step, CGA and step-to pattern. Performed exercises following and pt was seated up in chair. Pt is safe to go home aiwth assist at this time.  -DB     Row Name 08/06/21 1154          Vital Signs    O2 Delivery Pre Treatment  supplemental O2  -DB     O2 Delivery Intra Treatment  room air  -DB     O2 Delivery Post Treatment  room air  -DB     Pre Patient Position  Supine  -DB     Intra Patient Position  Standing  -DB     Post Patient Position  Sitting  -DB     Row Name 08/06/21 1154          Positioning and Restraints    Pre-Treatment Position  in bed  -DB     Post Treatment Position  chair  -DB     In Chair  reclined;sitting;call  light within reach;encouraged to call for assist;exit alarm on;with family/caregiver  -DB       User Key  (r) = Recorded By, (t) = Taken By, (c) = Cosigned By    Initials Name Provider Type    Olivia Hager PT Physical Therapist        Outcome Measures     Row Name 08/06/21 1156          How much help from another person do you currently need...    Turning from your back to your side while in flat bed without using bedrails?  4  -DB     Moving from lying on back to sitting on the side of a flat bed without bedrails?  4  -DB     Moving to and from a bed to a chair (including a wheelchair)?  4  -DB     Standing up from a chair using your arms (e.g., wheelchair, bedside chair)?  4  -DB     Climbing 3-5 steps with a railing?  3  -DB     To walk in hospital room?  4  -DB     AM-PAC 6 Clicks Score (PT)  23  -DB     Row Name 08/06/21 1156          Functional Assessment    Outcome Measure Options  AM-PAC 6 Clicks Basic Mobility (PT)  -DB       User Key  (r) = Recorded By, (t) = Taken By, (c) = Cosigned By    Initials Name Provider Type    DB Olivia Senior PT Physical Therapist                       Physical Therapy Education                 Title: PT OT SLP Therapies (Resolved)     Topic: Physical Therapy (Resolved)     Point: Mobility training (Resolved)     Learning Progress Summary           Patient Acceptance, E, VU by DB at 8/5/2021 1334                   Point: Home exercise program (Resolved)     Learning Progress Summary           Patient Acceptance, E, VU by DB at 8/5/2021 1334                   Point: Body mechanics (Resolved)     Learning Progress Summary           Patient Acceptance, E, VU by DB at 8/5/2021 1334                   Point: Precautions (Resolved)     Learning Progress Summary           Patient Acceptance, E, VU by DB at 8/5/2021 1334                               User Key     Initials Effective Dates Name Provider Type Discipline    DB 06/16/21 -  Olivia Senior PT Physical Therapist  PT              PT Recommendation and Plan  Planned Therapy Interventions (PT): balance training, bed mobility training, gait training, home exercise program, postural re-education, patient/family education, neuromuscular re-education, stair training, strengthening, transfer training  Plan of Care Reviewed With: patient, daughter  Progress: improving  Outcome Summary: POD2 L hip nailing. Pt able to ambulate to bathroom with S. Pt also agreeable to ambulate in hallway and practiced ascending and descending 1 step, CGA and step-to pattern. Performed exercises following and pt was seated up in chair. Pt is safe to go home aiwt assist at this time.     Time Calculation:   PT Charges     Row Name 08/06/21 1157             Time Calculation    Start Time  0832  -DB      Stop Time  0901  -DB      Time Calculation (min)  29 min  -DB      PT Received On  08/06/21  -DB         Time Calculation- PT    Total Timed Code Minutes- PT  29 minute(s)  -DB        User Key  (r) = Recorded By, (t) = Taken By, (c) = Cosigned By    Initials Name Provider Type    DB Olivia Senior, PT Physical Therapist        Therapy Charges for Today     Code Description Service Date Service Provider Modifiers Qty    90069691208 HC PT EVAL MOD COMPLEXITY 2 8/5/2021 Olivia Senior, PT GP 1    33008436828 HC PT THER PROC EA 15 MIN 8/5/2021 Olivia Senior, PT GP 2    34059798782 HC PT THER PROC EA 15 MIN 8/6/2021 Olivia Senior, PT GP 2          PT G-Codes  Outcome Measure Options: AM-PAC 6 Clicks Basic Mobility (PT)  AM-PAC 6 Clicks Score (PT): 23    Olivia Senior PT  8/6/2021

## 2021-08-25 ENCOUNTER — TELEPHONE (OUTPATIENT)
Dept: ORTHOPEDIC SURGERY | Facility: HOSPITAL | Age: 86
End: 2021-08-25

## 2021-08-25 NOTE — TELEPHONE ENCOUNTER
Called and spoke with Ms. Thomas as she is SP RTK. She states she is doing well. Went for her F/U appt yesterday and they said everything was looking good. She is ambulating well, using the walker and cane. Pain is controlled. She did say she has been having some leg spasms and it is making it hard to sleep. She states she told them yesterday but didn't give her anything for it. She had bad spasms last night so has a call out to the MD for a muscle relaxer. She has no questions/concerns for me at this time, my contact information was given should she need anything. Ms thomas voiced understanding.

## 2022-06-02 ENCOUNTER — LAB REQUISITION (OUTPATIENT)
Dept: LAB | Facility: HOSPITAL | Age: 87
End: 2022-06-02

## 2022-06-02 DIAGNOSIS — I13.0 HYPERTENSIVE HEART AND CHRONIC KIDNEY DISEASE WITH HEART FAILURE AND STAGE 1 THROUGH STAGE 4 CHRONIC KIDNEY DISEASE, OR UNSPECIFIED CHRONIC KIDNEY DISEASE: ICD-10-CM

## 2022-06-02 LAB
25(OH)D3 SERPL-MCNC: 75.3 NG/ML (ref 30–100)
ALBUMIN SERPL-MCNC: 4.1 G/DL (ref 3.5–5.2)
ALBUMIN/GLOB SERPL: 1.8 G/DL
ALP SERPL-CCNC: 89 U/L (ref 39–117)
ALT SERPL W P-5'-P-CCNC: 18 U/L (ref 1–33)
ANION GAP SERPL CALCULATED.3IONS-SCNC: 16.1 MMOL/L (ref 5–15)
AST SERPL-CCNC: 26 U/L (ref 1–32)
BASOPHILS # BLD AUTO: 0.1 10*3/MM3 (ref 0–0.2)
BASOPHILS NFR BLD AUTO: 1.4 % (ref 0–1.5)
BILIRUB SERPL-MCNC: 0.2 MG/DL (ref 0–1.2)
BUN SERPL-MCNC: 53 MG/DL (ref 8–23)
BUN/CREAT SERPL: 28.6 (ref 7–25)
CALCIUM SPEC-SCNC: 9.4 MG/DL (ref 8.2–9.6)
CHLORIDE SERPL-SCNC: 96 MMOL/L (ref 98–107)
CO2 SERPL-SCNC: 21.9 MMOL/L (ref 22–29)
CREAT SERPL-MCNC: 1.85 MG/DL (ref 0.57–1)
DEPRECATED RDW RBC AUTO: 48.1 FL (ref 37–54)
EGFRCR SERPLBLD CKD-EPI 2021: 25.6 ML/MIN/1.73
EOSINOPHIL # BLD AUTO: 0.2 10*3/MM3 (ref 0–0.4)
EOSINOPHIL NFR BLD AUTO: 2.3 % (ref 0.3–6.2)
ERYTHROCYTE [DISTWIDTH] IN BLOOD BY AUTOMATED COUNT: 15 % (ref 12.3–15.4)
GLOBULIN UR ELPH-MCNC: 2.3 GM/DL
GLUCOSE SERPL-MCNC: 101 MG/DL (ref 65–99)
HCT VFR BLD AUTO: 26.4 % (ref 34–46.6)
HGB BLD-MCNC: 8.6 G/DL (ref 12–15.9)
LYMPHOCYTES # BLD AUTO: 1.6 10*3/MM3 (ref 0.7–3.1)
LYMPHOCYTES NFR BLD AUTO: 18.2 % (ref 19.6–45.3)
MCH RBC QN AUTO: 30.3 PG (ref 26.6–33)
MCHC RBC AUTO-ENTMCNC: 32.6 G/DL (ref 31.5–35.7)
MCV RBC AUTO: 92.7 FL (ref 79–97)
MONOCYTES # BLD AUTO: 0.8 10*3/MM3 (ref 0.1–0.9)
MONOCYTES NFR BLD AUTO: 8.7 % (ref 5–12)
NEUTROPHILS NFR BLD AUTO: 6.2 10*3/MM3 (ref 1.7–7)
NEUTROPHILS NFR BLD AUTO: 69.4 % (ref 42.7–76)
NRBC BLD AUTO-RTO: 0 /100 WBC (ref 0–0.2)
PLATELET # BLD AUTO: 252 10*3/MM3 (ref 140–450)
PMV BLD AUTO: 8.2 FL (ref 6–12)
POTASSIUM SERPL-SCNC: 4.1 MMOL/L (ref 3.5–5.2)
PROT SERPL-MCNC: 6.4 G/DL (ref 6–8.5)
RBC # BLD AUTO: 2.85 10*6/MM3 (ref 3.77–5.28)
SODIUM SERPL-SCNC: 134 MMOL/L (ref 136–145)
WBC NRBC COR # BLD: 8.9 10*3/MM3 (ref 3.4–10.8)

## 2022-06-02 PROCEDURE — 82306 VITAMIN D 25 HYDROXY: CPT | Performed by: INTERNAL MEDICINE

## 2022-06-02 PROCEDURE — 85025 COMPLETE CBC W/AUTO DIFF WBC: CPT | Performed by: INTERNAL MEDICINE

## 2022-06-02 PROCEDURE — 80053 COMPREHEN METABOLIC PANEL: CPT | Performed by: INTERNAL MEDICINE

## 2023-02-21 ENCOUNTER — APPOINTMENT (OUTPATIENT)
Dept: GENERAL RADIOLOGY | Facility: HOSPITAL | Age: 88
End: 2023-02-21
Payer: MEDICARE

## 2023-02-21 ENCOUNTER — HOSPITAL ENCOUNTER (OUTPATIENT)
Facility: HOSPITAL | Age: 88
Discharge: HOME | End: 2023-02-21
Attending: EMERGENCY MEDICINE | Admitting: EMERGENCY MEDICINE
Payer: MEDICARE

## 2023-02-21 VITALS
SYSTOLIC BLOOD PRESSURE: 133 MMHG | DIASTOLIC BLOOD PRESSURE: 44 MMHG | RESPIRATION RATE: 18 BRPM | WEIGHT: 104 LBS | HEART RATE: 85 BPM | HEIGHT: 62 IN | BODY MASS INDEX: 19.14 KG/M2 | TEMPERATURE: 98 F | OXYGEN SATURATION: 99 %

## 2023-02-21 DIAGNOSIS — M25.512 ARTHRALGIA OF LEFT SHOULDER REGION: Primary | ICD-10-CM

## 2023-02-21 PROCEDURE — 99203 OFFICE O/P NEW LOW 30 MIN: CPT | Performed by: EMERGENCY MEDICINE

## 2023-02-21 PROCEDURE — G0463 HOSPITAL OUTPT CLINIC VISIT: HCPCS | Performed by: EMERGENCY MEDICINE

## 2023-02-21 PROCEDURE — 25010000002 DEXAMETHASONE SODIUM PHOSPHATE 10 MG/ML SOLUTION: Performed by: EMERGENCY MEDICINE

## 2023-02-21 PROCEDURE — 63710000001 ONDANSETRON ODT 4 MG TABLET DISPERSIBLE: Performed by: EMERGENCY MEDICINE

## 2023-02-21 PROCEDURE — 73030 X-RAY EXAM OF SHOULDER: CPT

## 2023-02-21 RX ORDER — ONDANSETRON 4 MG/1
4 TABLET, ORALLY DISINTEGRATING ORAL ONCE
Status: COMPLETED | OUTPATIENT
Start: 2023-02-21 | End: 2023-02-21

## 2023-02-21 RX ORDER — HYDROCODONE BITARTRATE AND ACETAMINOPHEN 5; 325 MG/1; MG/1
1 TABLET ORAL ONCE AS NEEDED
Status: DISCONTINUED | OUTPATIENT
Start: 2023-02-21 | End: 2023-02-21

## 2023-02-21 RX ORDER — DEXAMETHASONE SODIUM PHOSPHATE 10 MG/ML
8 INJECTION, SOLUTION INTRAMUSCULAR; INTRAVENOUS ONCE
Status: COMPLETED | OUTPATIENT
Start: 2023-02-21 | End: 2023-02-21

## 2023-02-21 RX ADMIN — DEXAMETHASONE SODIUM PHOSPHATE 8 MG: 10 INJECTION INTRAMUSCULAR; INTRAVENOUS at 13:44

## 2023-02-21 RX ADMIN — HYDROCODONE BITARTRATE AND ACETAMINOPHEN 1 TABLET: 5; 325 TABLET ORAL at 13:44

## 2023-02-21 RX ADMIN — ONDANSETRON 4 MG: 4 TABLET, ORALLY DISINTEGRATING ORAL at 13:44

## 2023-02-21 NOTE — FSED PROVIDER NOTE
"Subjective   History of Present Illness  Pt presents with mild severity L. Shoulder pain upon awakening, denies fall or trauma, pt RHD, no prior issues with shoulder, no back/neck/flank pain, hurts to move, ok at rest, no elbow or wrist pain, no cp/soa/ha    History provided by:  Patient and relative   used: No        Review of Systems   Constitutional: Negative.    HENT: Negative.    Musculoskeletal: Positive for arthralgias.   All other systems reviewed and are negative.      Past Medical History:   Diagnosis Date   • A-fib (HCC)    • A-fib (HCC)    • Hiatal hernia    • History of transfusion    • Osteoporosis        No Known Allergies    Past Surgical History:   Procedure Laterality Date   • CATARACT EXTRACTION, BILATERAL     • FEMUR IM NAILING/RODDING Right 8/4/2021    Procedure: PERCUTANEOUS PINNING OF RIGHT HIP;  Surgeon: Mark Valadez MD;  Location: [facility];  Service: Orthopedics;  Laterality: Right;   • HIP ENDOPROSTHESIS Left 1/16/2020    Procedure: LEFT HIP BI-POLAR;  Surgeon: Desmond Mcdonnell MD;  Location: [facility];  Service: Orthopedics   • HYSTERECTOMY         History reviewed. No pertinent family history.    Social History     Socioeconomic History   • Marital status:    Tobacco Use   • Smoking status: Every Day     Types: Cigarettes   • Smokeless tobacco: Never   • Tobacco comments:     \"About 4 or 5 a day\"   Vaping Use   • Vaping Use: Never used   Substance and Sexual Activity   • Alcohol use: Yes     Alcohol/week: 7.0 standard drinks     Types: 7 Glasses of wine per week   • Drug use: Never   • Sexual activity: Defer           Objective   Physical Exam  Vitals and nursing note reviewed.   HENT:      Head: Normocephalic.      Nose: Nose normal.      Mouth/Throat:      Mouth: Mucous membranes are moist.   Eyes:      Conjunctiva/sclera: Conjunctivae normal.   Cardiovascular:      Rate and Rhythm: Normal rate and regular rhythm.   Pulmonary:      Effort: " Pulmonary effort is normal.   Abdominal:      General: There is no distension.   Musculoskeletal:      Cervical back: Normal range of motion.      Comments: Diffuse non focal L. Shoulder pain, distally intact, no swelling, no clavicular or cervical pain   Skin:     General: Skin is warm.      Capillary Refill: Capillary refill takes less than 2 seconds.   Neurological:      General: No focal deficit present.      Mental Status: She is alert.   Psychiatric:         Mood and Affect: Mood normal.         Procedures           ED Course                                           Medical Decision Making  xr c/w arthralgia, pt to contact pcp for HH PT, sling given and otc meds at home for pain    Amount and/or Complexity of Data Reviewed  Radiology:  Decision-making details documented in ED Course.      Risk  Prescription drug management.          Final diagnoses:   Arthralgia of left shoulder region       ED Disposition  ED Disposition     ED Disposition   Discharge    Condition   Stable    Comment   --             Vickie Kowalski, APRN  [address] [facility] [address]  [facility] [address] [phone]    In 3 days  If symptoms worsen         Medication List      No changes were made to your prescriptions during this visit.

## 2023-10-05 ENCOUNTER — APPOINTMENT (OUTPATIENT)
Dept: GENERAL RADIOLOGY | Facility: HOSPITAL | Age: 88
End: 2023-10-05
Payer: MEDICARE

## 2023-10-05 ENCOUNTER — HOSPITAL ENCOUNTER (OUTPATIENT)
Facility: HOSPITAL | Age: 88
Discharge: HOME | End: 2023-10-05
Attending: EMERGENCY MEDICINE | Admitting: EMERGENCY MEDICINE
Payer: MEDICARE

## 2023-10-05 VITALS
HEIGHT: 62 IN | RESPIRATION RATE: 18 BRPM | TEMPERATURE: 97.4 F | DIASTOLIC BLOOD PRESSURE: 70 MMHG | BODY MASS INDEX: 16.93 KG/M2 | OXYGEN SATURATION: 95 % | WEIGHT: 92 LBS | SYSTOLIC BLOOD PRESSURE: 131 MMHG | HEART RATE: 98 BPM

## 2023-10-05 DIAGNOSIS — J06.9 VIRAL URI WITH COUGH: Primary | ICD-10-CM

## 2023-10-05 PROCEDURE — 71045 X-RAY EXAM CHEST 1 VIEW: CPT

## 2023-10-05 PROCEDURE — G0463 HOSPITAL OUTPT CLINIC VISIT: HCPCS | Performed by: EMERGENCY MEDICINE

## 2023-10-05 RX ORDER — BROMPHENIRAMINE MALEATE, PSEUDOEPHEDRINE HYDROCHLORIDE, AND DEXTROMETHORPHAN HYDROBROMIDE 2; 30; 10 MG/5ML; MG/5ML; MG/5ML
2.5 SYRUP ORAL 3 TIMES DAILY PRN
Qty: 210 ML | Refills: 0 | Status: SHIPPED | OUTPATIENT
Start: 2023-10-05

## 2023-10-05 NOTE — FSED PROVIDER NOTE
"Subjective   History of Present Illness  Pt presents with mild cough and congestion, recently completed course of atbx and steroid with little improvement, does admit to some increasing anxiety and was recently increased her zoloft to 40mg/day, she states she weighs herself every day and has not had any wt gain c/w chf history, no cp/angina, no abd pain/n/v/d but has poor appetite, pt does not want swabbed here, no alleviating factors    History provided by:  Patient and relative   used: No      Review of Systems   HENT:  Positive for congestion.    Respiratory:  Positive for cough.    Cardiovascular:  Negative for chest pain.   Gastrointestinal:  Negative for abdominal distention.   All other systems reviewed and are negative.    Past Medical History:   Diagnosis Date    A-fib     A-fib     Hiatal hernia     History of transfusion     Osteoporosis        No Known Allergies    Past Surgical History:   Procedure Laterality Date    CATARACT EXTRACTION, BILATERAL      FEMUR IM NAILING/RODDING Right 8/4/2021    Procedure: PERCUTANEOUS PINNING OF RIGHT HIP;  Surgeon: Mark Valadez MD;  Location: [facility];  Service: Orthopedics;  Laterality: Right;    HIP ENDOPROSTHESIS Left 1/16/2020    Procedure: LEFT HIP BI-POLAR;  Surgeon: Desmond Mcdonnell MD;  Location: [facility];  Service: Orthopedics    HYSTERECTOMY         History reviewed. No pertinent family history.    Social History     Socioeconomic History    Marital status:    Tobacco Use    Smoking status: Every Day     Types: Cigarettes    Smokeless tobacco: Never    Tobacco comments:     \"About 4 or 5 a day\"   Vaping Use    Vaping Use: Never used   Substance and Sexual Activity    Alcohol use: Yes     Alcohol/week: 7.0 standard drinks     Types: 7 Glasses of wine per week    Drug use: Never    Sexual activity: Defer           Objective   Physical Exam  Vitals and nursing note reviewed.   HENT:      Head: Normocephalic.      Nose: " Nose normal.      Mouth/Throat:      Mouth: Mucous membranes are moist.   Eyes:      Conjunctiva/sclera: Conjunctivae normal.   Cardiovascular:      Rate and Rhythm: Normal rate.   Pulmonary:      Effort: Pulmonary effort is normal.   Musculoskeletal:         General: Normal range of motion.      Cervical back: Normal range of motion.   Skin:     General: Skin is warm.   Neurological:      General: No focal deficit present.      Mental Status: She is alert.   Psychiatric:         Mood and Affect: Mood normal.       Procedures           ED Course                                           Medical Decision Making  - cxr for infiltrate or congestion, ch changes otherwise, advised tylenol pm if anxiety keeping up at night    Amount and/or Complexity of Data Reviewed  Radiology: ordered. Decision-making details documented in ED Course.        Final diagnoses:   Viral URI with cough       ED Disposition  ED Disposition       ED Disposition   Discharge    Condition   Stable    Comment   --               Vale Almanza, APRN  [address] [facility] [place] [address] [phone]    In 3 days  If symptoms worsen         Medication List        New Prescriptions      brompheniramine-pseudoephedrine-DM 30-2-10 MG/5ML syrup  Take 2.5 mL by mouth 3 (Three) Times a Day As Needed for Congestion or Cough.               Where to Get Your Medications        These medications were sent to [id number] - [place], [place] - [address] AT [place] [address] & [address] - [phone] [facility] [phone] FX  [address] [place] [address]      Phone: [phone]   brompheniramine-pseudoephedrine-DM 30-2-10 MG/5ML syrup

## 2023-10-29 ENCOUNTER — HOSPITAL ENCOUNTER (OUTPATIENT)
Facility: HOSPITAL | Age: 88
Discharge: HOME | End: 2023-10-29
Attending: STUDENT IN AN ORGANIZED HEALTH CARE EDUCATION/TRAINING PROGRAM | Admitting: STUDENT IN AN ORGANIZED HEALTH CARE EDUCATION/TRAINING PROGRAM
Payer: MEDICARE

## 2023-10-29 ENCOUNTER — APPOINTMENT (OUTPATIENT)
Dept: GENERAL RADIOLOGY | Facility: HOSPITAL | Age: 88
End: 2023-10-29
Payer: MEDICARE

## 2023-10-29 VITALS
RESPIRATION RATE: 18 BRPM | HEART RATE: 83 BPM | HEIGHT: 62 IN | DIASTOLIC BLOOD PRESSURE: 59 MMHG | OXYGEN SATURATION: 95 % | TEMPERATURE: 98.1 F | WEIGHT: 90 LBS | BODY MASS INDEX: 16.56 KG/M2 | SYSTOLIC BLOOD PRESSURE: 118 MMHG

## 2023-10-29 DIAGNOSIS — M54.50 CHRONIC LOW BACK PAIN WITHOUT SCIATICA, UNSPECIFIED BACK PAIN LATERALITY: Primary | ICD-10-CM

## 2023-10-29 DIAGNOSIS — G89.29 CHRONIC LOW BACK PAIN WITHOUT SCIATICA, UNSPECIFIED BACK PAIN LATERALITY: Primary | ICD-10-CM

## 2023-10-29 PROCEDURE — 72110 X-RAY EXAM L-2 SPINE 4/>VWS: CPT

## 2023-10-29 PROCEDURE — G0463 HOSPITAL OUTPT CLINIC VISIT: HCPCS | Performed by: STUDENT IN AN ORGANIZED HEALTH CARE EDUCATION/TRAINING PROGRAM

## 2023-10-29 PROCEDURE — 63710000001 PREDNISONE PER 1 MG: Performed by: STUDENT IN AN ORGANIZED HEALTH CARE EDUCATION/TRAINING PROGRAM

## 2023-10-29 RX ORDER — LIDOCAINE 50 MG/G
1 PATCH TOPICAL ONCE
Status: DISCONTINUED | OUTPATIENT
Start: 2023-10-29 | End: 2023-10-29

## 2023-10-29 RX ORDER — ACETAMINOPHEN 325 MG/1
650 TABLET ORAL EVERY 4 HOURS PRN
Qty: 30 TABLET | Refills: 0 | Status: SHIPPED | OUTPATIENT
Start: 2023-10-29 | End: 2023-11-03

## 2023-10-29 RX ORDER — LIDOCAINE 50 MG/G
1 PATCH TOPICAL EVERY 24 HOURS
Qty: 7 PATCH | Refills: 0 | Status: SHIPPED | OUTPATIENT
Start: 2023-10-29 | End: 2023-11-05

## 2023-10-29 RX ORDER — PREDNISONE 20 MG/1
20 TABLET ORAL ONCE
Status: COMPLETED | OUTPATIENT
Start: 2023-10-29 | End: 2023-10-29

## 2023-10-29 RX ORDER — PREDNISONE 20 MG/1
20 TABLET ORAL DAILY
Qty: 4 TABLET | Refills: 0 | Status: SHIPPED | OUTPATIENT
Start: 2023-10-29 | End: 2023-11-02

## 2023-10-29 RX ADMIN — LIDOCAINE 1 PATCH: 50 PATCH TOPICAL at 15:10

## 2023-10-29 RX ADMIN — PREDNISONE 20 MG: 20 TABLET ORAL at 15:10

## 2023-10-29 NOTE — DISCHARGE INSTRUCTIONS
You were seen in the emergency department for back pain.  Your x-ray showed a chronic compression deformity at L1 but it is unchanged from previous imaging.  You may have spinal stenosis.  You have been discharged home with a course of steroids, Tylenol and lidocaine patches.  If the lidocaine patches are too costly, you may get over-the-counter lidocaine patches.    Return to the ER for any new or worsening symptoms including uncontrolled pain, worsening pain, accidentally peeing on yourself, feeling like you have to pee but are unable to pee, any numbness when you wipe yourself off after peeing, numbness or weakness in either one of your legs, fever, chills, or any other concerns.  Please follow up with your regular doctor.

## 2023-10-29 NOTE — [facility] PROVIDER NOTE
Subjective   History of Present Illness  Patient is a 89-year-old female presents emergency department for back pain.  Patient is a history of atrial fibrillation, helical hernia, osteoporosis, lumbar compression fractures.  Patient states she ambulates with a walker, she is still able to ambulate with a walker.  Patient denies any falls or injury but states yesterday she had increasing lower back pain.  She states pain is fine when ambulating, pain is worse when going from a seated to standing position.  She denies any pain radiating down her buttocks or into her legs.  She had no lower extremity weakness.  Patient denies any saddle anesthesia, urinary retention, bowel incontinence, fever, chills or midline pain.      Review of Systems   Constitutional:  Negative for activity change and fever.   HENT:  Negative for congestion, rhinorrhea and sore throat.    Respiratory:  Negative for cough and shortness of breath.    Cardiovascular:  Negative for chest pain.   Gastrointestinal:  Negative for abdominal pain, nausea and vomiting.   Genitourinary:  Negative for dysuria.   Musculoskeletal:  Positive for back pain. Negative for myalgias.   Skin:  Negative for rash.   Neurological:  Negative for dizziness, light-headedness and headaches.   Psychiatric/Behavioral:  Negative for confusion.        Past Medical History:   Diagnosis Date    A-fib     A-fib     Hiatal hernia     History of transfusion     Osteoporosis        No Known Allergies    Past Surgical History:   Procedure Laterality Date    CATARACT EXTRACTION, BILATERAL      FEMUR IM NAILING/RODDING Right 8/4/2021    Procedure: PERCUTANEOUS PINNING OF RIGHT HIP;  Surgeon: Mark Valadez MD;  Location: [facility];  Service: Orthopedics;  Laterality: Right;    HIP ENDOPROSTHESIS Left 1/16/2020    Procedure: LEFT HIP BI-POLAR;  Surgeon: Desmond Mcdonnell MD;  Location: [facility];  Service: Orthopedics    HYSTERECTOMY         No family history on file.    Social  "History     Socioeconomic History    Marital status:    Tobacco Use    Smoking status: Every Day     Types: Cigarettes    Smokeless tobacco: Never    Tobacco comments:     \"About 4 or 5 a day\"   Vaping Use    Vaping Use: Never used   Substance and Sexual Activity    Alcohol use: Yes     Alcohol/week: 7.0 standard drinks of alcohol     Types: 7 Glasses of wine per week    Drug use: Never    Sexual activity: Defer           Objective   Physical Exam  Vitals and nursing note reviewed.   Constitutional:       General: She is not in acute distress.     Appearance: Normal appearance. She is not ill-appearing.   HENT:      Head: Normocephalic and atraumatic.      Nose: Nose normal. No congestion.      Mouth/Throat:      Mouth: Mucous membranes are moist.      Pharynx: No oropharyngeal exudate.   Eyes:      Conjunctiva/sclera: Conjunctivae normal.   Cardiovascular:      Rate and Rhythm: Normal rate and regular rhythm.      Heart sounds: No murmur heard.  Pulmonary:      Effort: Pulmonary effort is normal.      Breath sounds: No wheezing, rhonchi or rales.   Abdominal:      General: Abdomen is flat.      Palpations: Abdomen is soft.      Tenderness: There is no abdominal tenderness. There is no guarding or rebound.   Musculoskeletal:         General: No swelling or tenderness. Normal range of motion.      Cervical back: Normal range of motion and neck supple. No bony tenderness.      Thoracic back: No bony tenderness.      Lumbar back: No spasms, tenderness or bony tenderness. Negative right straight leg raise test and negative left straight leg raise test.      Comments: No midline back pain   Skin:     General: Skin is warm and dry.      Findings: No rash.   Neurological:      General: No focal deficit present.      Mental Status: She is alert and oriented to person, place, and time.      Sensory: Sensation is intact. No sensory deficit.      Motor: Motor function is intact. No weakness.      Comments: 5/5 motor " strength with bilateral hip flexion/extension, knee flexion/extension, ankle dorsiflexion/plantarflexion. +2/4 patellar reflexes bilaterally.  Ambulates with steady gait         Procedures           ED Course  ED Course as of 10/29/23 1607   Sun Oct 29, 2023   1604 XR Spine Lumbar Complete 4+VW  IMPRESSION:  Impression:  1.Multiple compression deformities, with height loss most prominent at L1, similar to prior radiographs.  2.No definite radiographic findings of acute osseous lumbar spine abnormality.  3.Advanced facet arthropathy and mild to moderate disc degeneration.  4.Osseous demineralization [CO]      ED Course User Index  [CO] Coral Harris,                                            Medical Decision Making  Patient is a 89-year-old female presents emergency department for back pain.  Patient has chronic back pain worsening yesterday.  No concerning trauma, injury, urinary retention, bowel incontinence, lower extremity weakness.  Pain is worse when going from a seated to standing position, this may represent spinal stenosis.  X-ray imaging does show a chronic lumbar compression fracture at L1, otherwise chronic changes but no acute or new findings.  Patient unable to take NSAIDs secondary to chronic kidney disease and is declining Toradol.  Will discharge home with lidocaine patches, Tylenol and prednisone.  Patient to follow-up with her primary care physician return for any worsening symptoms.    Problems Addressed:  Chronic low back pain without sciatica, unspecified back pain laterality: complicated acute illness or injury    Amount and/or Complexity of Data Reviewed  Radiology: ordered. Decision-making details documented in ED Course.    Risk  OTC drugs.  Prescription drug management.        Final diagnoses:   Chronic low back pain without sciatica, unspecified back pain laterality       ED Disposition  ED Disposition       ED Disposition   Discharge    Condition   Stable    Comment   --                Vale Almanza, APRN  [address] [facility] [place] [address] [phone]    Schedule an appointment as soon as possible for a visit in 1 week      [place] [address] [address] [place] [id number] [phone]    As needed, If symptoms worsen         Medication List        New Prescriptions      lidocaine 5 %  Commonly known as: LIDODERM  Place 1 patch on the skin as directed by provider Daily for 7 days. Remove & Discard patch within 12 hours or as directed by MD     predniSONE 20 MG tablet  Commonly known as: DELTASONE  Take 1 tablet by mouth Daily for 4 days.            Changed      * acetaminophen 500 MG tablet  Commonly known as: TYLENOL  What changed: Another medication with the same name was added. Make sure you understand how and when to take each.     * acetaminophen 325 MG tablet  Commonly known as: Tylenol  Take 2 tablets by mouth Every 4 (Four) Hours As Needed for Mild Pain for up to 5 days.  What changed: You were already taking a medication with the same name, and this prescription was added. Make sure you understand how and when to take each.           * This list has 2 medication(s) that are the same as other medications prescribed for you. Read the directions carefully, and ask your doctor or other care provider to review them with you.                   Where to Get Your Medications        These medications were sent to [id number] [place] [facility] [address] AT [place] [address] [address] - [phone] [facility] [phone] FX  [address] [facility] [place] [address]      Phone: [phone]   acetaminophen 325 MG tablet  lidocaine 5 %  predniSONE 20 MG tablet

## (undated) DEVICE — PREP IM ENCHANCED TOTAL HIP BONE                                    PREPARATION KIT: Brand: PREP-IM

## (undated) DEVICE — ANTIBACTERIAL UNDYED BRAIDED (POLYGLACTIN 910), SYNTHETIC ABSORBABLE SUTURE: Brand: COATED VICRYL

## (undated) DEVICE — STPLR SKIN VISISTAT WD 35CT

## (undated) DEVICE — HANDPIECE SET WITH COAXIAL HIGH FLOW TIP AND SUCTION TUBE: Brand: INTERPULSE

## (undated) DEVICE — DRAPE,U/ SHT,SPLIT,PLAS,STERIL: Brand: MEDLINE

## (undated) DEVICE — DRSNG WND SIL OPTIFOAM GENTLE BRDR ADHS W/SA 4X4IN

## (undated) DEVICE — GLV SURG BIOGEL LTX PF 8

## (undated) DEVICE — Device

## (undated) DEVICE — DRSNG SURESITE WNDW 4X4.5

## (undated) DEVICE — SUT VIC 0 CT1 CR8 18IN J840D

## (undated) DEVICE — PENCL E/S ULTRAVAC TELESCP NOSE HOLSTR 10FT

## (undated) DEVICE — MAT FLR ABSORBENT LG 4FT 10 2.5FT

## (undated) DEVICE — PK HIP TOTL 40

## (undated) DEVICE — PK HIP PINNING 40

## (undated) DEVICE — APPL CHLORAPREP W/TINT 26ML ORNG

## (undated) DEVICE — COAXIAL FEMORAL CANAL TIP

## (undated) DEVICE — TRAP FLD MINIVAC MEGADYNE 100ML

## (undated) DEVICE — ADHS SKIN DERMABOND TOP ADVANCED

## (undated) DEVICE — ADHS SKIN SURG TISS VISC PREMIERPRO EXOFIN HI/VISC FAST/DRY

## (undated) DEVICE — PAD,ABDOMINAL,8"X10",ST,LF: Brand: MEDLINE

## (undated) DEVICE — NEEDLE, QUINCKE, 20GX3.5": Brand: MEDLINE

## (undated) DEVICE — DRSNG GZ PETROLTM XEROFORM CURAD 1X8IN STRL

## (undated) DEVICE — GLV SURG PREMIERPRO ORTHO LTX PF SZ8 BRN

## (undated) DEVICE — GLV SURG TRIUMPH CLASSIC PF LTX 8.5 STRL

## (undated) DEVICE — SOL ISO/ALC RUB 70PCT 4OZ

## (undated) DEVICE — 3M™ IOBAN™ 2 ANTIMICROBIAL INCISE DRAPE 6650EZ: Brand: IOBAN™ 2

## (undated) DEVICE — DRP C/ARMOR

## (undated) DEVICE — APPL CHLORAPREP HI/LITE 26ML ORNG

## (undated) DEVICE — DRAPE,REIN 53X77,STERILE: Brand: MEDLINE

## (undated) DEVICE — SUT MNCRYL PLS ANTIB UD 4/0 PS2 18IN

## (undated) DEVICE — CEMENT MIXING SYSTEM WITH FEMORAL BREAKWAY NOZZLE: Brand: REVOLUTION

## (undated) DEVICE — SYR LUERLOK 20CC BX/50

## (undated) DEVICE — PREP SOL POVIDONE/IODINE BT 4OZ

## (undated) DEVICE — SPNG GZ WOVN 4X4IN 12PLY 10/BX STRL

## (undated) DEVICE — GUIDEPIN PT TROC TP 3.2MM 12IN

## (undated) DEVICE — GLV SURG SIGNATURE ESSENTIAL PF LTX SZ8